# Patient Record
Sex: MALE | Race: BLACK OR AFRICAN AMERICAN | NOT HISPANIC OR LATINO | Employment: OTHER | URBAN - METROPOLITAN AREA
[De-identification: names, ages, dates, MRNs, and addresses within clinical notes are randomized per-mention and may not be internally consistent; named-entity substitution may affect disease eponyms.]

---

## 2024-07-02 ENCOUNTER — TELEPHONE (OUTPATIENT)
Dept: INTERNAL MEDICINE | Facility: CLINIC | Age: 69
End: 2024-07-02
Payer: MEDICARE

## 2024-07-02 NOTE — TELEPHONE ENCOUNTER
----- Message from Alma Reese sent at 7/1/2024  5:32 PM CDT -----  Type:  Sooner Apoointment Request    Caller is requesting a sooner appointment.  Caller declined first available appointment listed below.  Caller will not accept being placed on the waitlist and is requesting a message be sent to doctor.  Name of Caller: Pt  When is the first available appointment?  Symptoms: Annual  Would the patient rather a call back or a response via [facility]? Call  Best Call Back Number:  [phone]  Additional Information:

## 2024-07-10 ENCOUNTER — LAB VISIT (OUTPATIENT)
Dept: LAB | Facility: HOSPITAL | Age: 69
End: 2024-07-10
Attending: INTERNAL MEDICINE
Payer: MEDICARE

## 2024-07-10 ENCOUNTER — OFFICE VISIT (OUTPATIENT)
Dept: INTERNAL MEDICINE | Facility: CLINIC | Age: 69
End: 2024-07-10
Payer: MEDICARE

## 2024-07-10 VITALS
HEIGHT: 71 IN | DIASTOLIC BLOOD PRESSURE: 84 MMHG | HEART RATE: 56 BPM | SYSTOLIC BLOOD PRESSURE: 126 MMHG | BODY MASS INDEX: 21.05 KG/M2 | WEIGHT: 150.38 LBS | OXYGEN SATURATION: 98 %

## 2024-07-10 DIAGNOSIS — R35.89 POLYURIA: ICD-10-CM

## 2024-07-10 DIAGNOSIS — N40.0 BENIGN PROSTATIC HYPERPLASIA, UNSPECIFIED WHETHER LOWER URINARY TRACT SYMPTOMS PRESENT: ICD-10-CM

## 2024-07-10 DIAGNOSIS — Z12.11 COLON CANCER SCREENING: ICD-10-CM

## 2024-07-10 DIAGNOSIS — R97.20 ELEVATED PSA: ICD-10-CM

## 2024-07-10 DIAGNOSIS — E03.9 HYPOTHYROIDISM, UNSPECIFIED TYPE: Primary | ICD-10-CM

## 2024-07-10 DIAGNOSIS — Z11.59 ENCOUNTER FOR HEPATITIS C SCREENING TEST FOR LOW RISK PATIENT: ICD-10-CM

## 2024-07-10 DIAGNOSIS — R79.9 ABNORMAL FINDING OF BLOOD CHEMISTRY, UNSPECIFIED: ICD-10-CM

## 2024-07-10 DIAGNOSIS — Z72.0 TOBACCO ABUSE: ICD-10-CM

## 2024-07-10 DIAGNOSIS — R00.1 BRADYCARDIA: ICD-10-CM

## 2024-07-10 DIAGNOSIS — E03.9 HYPOTHYROIDISM, UNSPECIFIED TYPE: ICD-10-CM

## 2024-07-10 DIAGNOSIS — Z87.891 PERSONAL HISTORY OF NICOTINE DEPENDENCE: ICD-10-CM

## 2024-07-10 LAB
ALBUMIN SERPL BCP-MCNC: 3.9 G/DL (ref 3.5–5.2)
ALP SERPL-CCNC: 76 U/L (ref 55–135)
ALT SERPL W/O P-5'-P-CCNC: 13 U/L (ref 10–44)
ANION GAP SERPL CALC-SCNC: 6 MMOL/L (ref 8–16)
AST SERPL-CCNC: 19 U/L (ref 10–40)
BASOPHILS # BLD AUTO: 0.03 K/UL (ref 0–0.2)
BASOPHILS NFR BLD: 1.2 % (ref 0–1.9)
BILIRUB SERPL-MCNC: 0.4 MG/DL (ref 0.1–1)
BILIRUB UR QL STRIP: NEGATIVE
BUN SERPL-MCNC: 17 MG/DL (ref 8–23)
CALCIUM SERPL-MCNC: 9.7 MG/DL (ref 8.7–10.5)
CHLORIDE SERPL-SCNC: 102 MMOL/L (ref 95–110)
CHOLEST SERPL-MCNC: 152 MG/DL (ref 120–199)
CHOLEST/HDLC SERPL: 2.4 {RATIO} (ref 2–5)
CLARITY UR REFRACT.AUTO: CLEAR
CO2 SERPL-SCNC: 30 MMOL/L (ref 23–29)
COLOR UR AUTO: YELLOW
COMPLEXED PSA SERPL-MCNC: 17.6 NG/ML (ref 0–4)
CREAT SERPL-MCNC: 1.2 MG/DL (ref 0.5–1.4)
DIFFERENTIAL METHOD BLD: ABNORMAL
EOSINOPHIL # BLD AUTO: 0.1 K/UL (ref 0–0.5)
EOSINOPHIL NFR BLD: 3.7 % (ref 0–8)
ERYTHROCYTE [DISTWIDTH] IN BLOOD BY AUTOMATED COUNT: 13.9 % (ref 11.5–14.5)
EST. GFR  (NO RACE VARIABLE): >60 ML/MIN/1.73 M^2
ESTIMATED AVG GLUCOSE: 94 MG/DL (ref 68–131)
GLUCOSE SERPL-MCNC: 71 MG/DL (ref 70–110)
GLUCOSE UR QL STRIP: NEGATIVE
HBA1C MFR BLD: 4.9 % (ref 4–5.6)
HCT VFR BLD AUTO: 45.6 % (ref 40–54)
HCV AB SERPL QL IA: NORMAL
HDLC SERPL-MCNC: 63 MG/DL (ref 40–75)
HDLC SERPL: 41.4 % (ref 20–50)
HGB BLD-MCNC: 14.7 G/DL (ref 14–18)
HGB UR QL STRIP: NEGATIVE
IMM GRANULOCYTES # BLD AUTO: 0.01 K/UL (ref 0–0.04)
IMM GRANULOCYTES NFR BLD AUTO: 0.4 % (ref 0–0.5)
KETONES UR QL STRIP: NEGATIVE
LDLC SERPL CALC-MCNC: 80.2 MG/DL (ref 63–159)
LEUKOCYTE ESTERASE UR QL STRIP: NEGATIVE
LYMPHOCYTES # BLD AUTO: 1.2 K/UL (ref 1–4.8)
LYMPHOCYTES NFR BLD: 48 % (ref 18–48)
MCH RBC QN AUTO: 35.2 PG (ref 27–31)
MCHC RBC AUTO-ENTMCNC: 32.2 G/DL (ref 32–36)
MCV RBC AUTO: 109 FL (ref 82–98)
MONOCYTES # BLD AUTO: 0.4 K/UL (ref 0.3–1)
MONOCYTES NFR BLD: 15.2 % (ref 4–15)
NEUTROPHILS # BLD AUTO: 0.8 K/UL (ref 1.8–7.7)
NEUTROPHILS NFR BLD: 31.5 % (ref 38–73)
NITRITE UR QL STRIP: NEGATIVE
NONHDLC SERPL-MCNC: 89 MG/DL
NRBC BLD-RTO: 0 /100 WBC
PH UR STRIP: 6 [PH] (ref 5–8)
PLATELET # BLD AUTO: 324 K/UL (ref 150–450)
PMV BLD AUTO: 10.6 FL (ref 9.2–12.9)
POTASSIUM SERPL-SCNC: 4.4 MMOL/L (ref 3.5–5.1)
PROT SERPL-MCNC: 7.3 G/DL (ref 6–8.4)
PROT UR QL STRIP: NEGATIVE
RBC # BLD AUTO: 4.18 M/UL (ref 4.6–6.2)
SODIUM SERPL-SCNC: 138 MMOL/L (ref 136–145)
SP GR UR STRIP: 1.02 (ref 1–1.03)
TRIGL SERPL-MCNC: 44 MG/DL (ref 30–150)
TSH SERPL DL<=0.005 MIU/L-ACNC: 0.83 UIU/ML (ref 0.4–4)
URN SPEC COLLECT METH UR: NORMAL
WBC # BLD AUTO: 2.44 K/UL (ref 3.9–12.7)

## 2024-07-10 PROCEDURE — 99204 OFFICE O/P NEW MOD 45 MIN: CPT | Mod: 25,S$GLB,, | Performed by: INTERNAL MEDICINE

## 2024-07-10 PROCEDURE — 36415 COLL VENOUS BLD VENIPUNCTURE: CPT | Performed by: INTERNAL MEDICINE

## 2024-07-10 PROCEDURE — 84443 ASSAY THYROID STIM HORMONE: CPT | Performed by: INTERNAL MEDICINE

## 2024-07-10 PROCEDURE — 90715 TDAP VACCINE 7 YRS/> IM: CPT | Mod: S$GLB,,, | Performed by: INTERNAL MEDICINE

## 2024-07-10 PROCEDURE — 1101F PT FALLS ASSESS-DOCD LE1/YR: CPT | Mod: CPTII,S$GLB,, | Performed by: INTERNAL MEDICINE

## 2024-07-10 PROCEDURE — 90472 IMMUNIZATION ADMIN EACH ADD: CPT | Mod: S$GLB,,, | Performed by: INTERNAL MEDICINE

## 2024-07-10 PROCEDURE — 3288F FALL RISK ASSESSMENT DOCD: CPT | Mod: CPTII,S$GLB,, | Performed by: INTERNAL MEDICINE

## 2024-07-10 PROCEDURE — 1160F RVW MEDS BY RX/DR IN RCRD: CPT | Mod: CPTII,S$GLB,, | Performed by: INTERNAL MEDICINE

## 2024-07-10 PROCEDURE — 3008F BODY MASS INDEX DOCD: CPT | Mod: CPTII,S$GLB,, | Performed by: INTERNAL MEDICINE

## 2024-07-10 PROCEDURE — 81003 URINALYSIS AUTO W/O SCOPE: CPT | Performed by: INTERNAL MEDICINE

## 2024-07-10 PROCEDURE — 80061 LIPID PANEL: CPT | Performed by: INTERNAL MEDICINE

## 2024-07-10 PROCEDURE — 99999 PR PBB SHADOW E&M-EST. PATIENT-LVL III: CPT | Mod: PBBFAC,,, | Performed by: INTERNAL MEDICINE

## 2024-07-10 PROCEDURE — G0009 ADMIN PNEUMOCOCCAL VACCINE: HCPCS | Mod: S$GLB,,, | Performed by: INTERNAL MEDICINE

## 2024-07-10 PROCEDURE — 90677 PCV20 VACCINE IM: CPT | Mod: S$GLB,,, | Performed by: INTERNAL MEDICINE

## 2024-07-10 PROCEDURE — 86803 HEPATITIS C AB TEST: CPT | Performed by: INTERNAL MEDICINE

## 2024-07-10 PROCEDURE — 85025 COMPLETE CBC W/AUTO DIFF WBC: CPT | Performed by: INTERNAL MEDICINE

## 2024-07-10 PROCEDURE — 1159F MED LIST DOCD IN RCRD: CPT | Mod: CPTII,S$GLB,, | Performed by: INTERNAL MEDICINE

## 2024-07-10 PROCEDURE — 80053 COMPREHEN METABOLIC PANEL: CPT | Performed by: INTERNAL MEDICINE

## 2024-07-10 PROCEDURE — 3079F DIAST BP 80-89 MM HG: CPT | Mod: CPTII,S$GLB,, | Performed by: INTERNAL MEDICINE

## 2024-07-10 PROCEDURE — 1126F AMNT PAIN NOTED NONE PRSNT: CPT | Mod: CPTII,S$GLB,, | Performed by: INTERNAL MEDICINE

## 2024-07-10 PROCEDURE — 3044F HG A1C LEVEL LT 7.0%: CPT | Mod: CPTII,S$GLB,, | Performed by: INTERNAL MEDICINE

## 2024-07-10 PROCEDURE — 83036 HEMOGLOBIN GLYCOSYLATED A1C: CPT | Performed by: INTERNAL MEDICINE

## 2024-07-10 PROCEDURE — 3074F SYST BP LT 130 MM HG: CPT | Mod: CPTII,S$GLB,, | Performed by: INTERNAL MEDICINE

## 2024-07-10 PROCEDURE — 84153 ASSAY OF PSA TOTAL: CPT | Performed by: INTERNAL MEDICINE

## 2024-07-10 RX ORDER — TAMSULOSIN HYDROCHLORIDE 0.4 MG/1
0.8 CAPSULE ORAL DAILY
Qty: 180 CAPSULE | Refills: 3 | Status: SHIPPED | OUTPATIENT
Start: 2024-07-10

## 2024-07-10 RX ORDER — TAMSULOSIN HYDROCHLORIDE 0.4 MG/1
1 CAPSULE ORAL
COMMUNITY
Start: 2024-03-16 | End: 2024-07-10 | Stop reason: SDUPTHER

## 2024-07-10 RX ORDER — LEVOTHYROXINE SODIUM 175 UG/1
175 TABLET ORAL EVERY MORNING
COMMUNITY
End: 2024-07-10 | Stop reason: SDUPTHER

## 2024-07-10 RX ORDER — LEVOTHYROXINE SODIUM 175 UG/1
175 TABLET ORAL EVERY MORNING
Qty: 90 TABLET | Refills: 3 | Status: SHIPPED | OUTPATIENT
Start: 2024-07-10 | End: 2024-07-10 | Stop reason: SDUPTHER

## 2024-07-10 RX ORDER — TAMSULOSIN HYDROCHLORIDE 0.4 MG/1
0.8 CAPSULE ORAL DAILY
Qty: 180 CAPSULE | Refills: 3 | Status: SHIPPED | OUTPATIENT
Start: 2024-07-10 | End: 2024-07-10 | Stop reason: SDUPTHER

## 2024-07-10 RX ORDER — LEVOTHYROXINE SODIUM 175 UG/1
175 TABLET ORAL EVERY MORNING
Qty: 90 TABLET | Refills: 3 | Status: SHIPPED | OUTPATIENT
Start: 2024-07-10

## 2024-07-10 NOTE — PROGRESS NOTES
"  This note was generated with whistleBox voice recognition software. I apologize for any possible typographical errors.  MModal seems to have trouble with pronouns so I apologize if I Mis pronoun anyone     New Patient coming in to establish care.  [place] [facility] [place]      BPH-- has had for several months. On tamisulin which helped out a lot first, but he has started having more issues with frequent urination. No incontinence - has to get up 4 times a night .  Out of his tamisulin today for about a months.  I see " elevated PSA" as a diagnosis on his med records- can't see full notes. Note from 3/2024-  Did not see urologist.  I also see a nephrologist referral-- did not see nephrologist.   This was from 7/2022      He has hypothyroidism- he is taking  175 mcg-  he had thyroid ablation- radioactive over 20 years ago.  He has meds-- energy level is doing ok.      Tobacco abuse-- he smokes 0.5-1.0 ppd-  He has smoked 40 years-- Does not want to quit.          Social history:  rare etoh use.  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    Single- living alone unless sister comes ot stay with him.      PSH:  tosillectomy at 4 year old.    Never had colonoscopy-    Father had prostate cancer. And diabetes , htn   Mother became type 2 at 75.      ROS : Gen - no fatigue or significant weight change  Eyes - no eye pain or visual changes-- got eye glasses last year.    ENT - no hoarseness or sore throat  CV - No chest pain or SOB.  NO palpitations.  Pulm - no cough or wheezing  GI - no N/V/D   no dysuria or incontinence  MS - no joint pain or muscle pain  Skin - no rash, or c/o of skin lesions  Neuro - no HA, dizziness--- memory is doing well.   Heme - no abnormal bleeding or bruising  Endo - no polydipsia, or temperature changes  Psych - no anxiety or depression           /84 (BP Location: Left arm, Patient Position: Sitting, BP Method: Medium (Manual))   Pulse (!) 56   Ht 5' 11" (1.803 m)   Wt " 68.2 kg (150 lb 5.7 oz)   SpO2 98%   BMI 20.97 kg/m²     General appearance: alert, appears stated age, and cooperative  Head: Normocephalic, without obvious abnormality, atraumatic  Eyes: conjunctivae/corneas clear. PERRL, EOM's intact. Fundi benign.  Neck: no adenopathy, no carotid bruit, no JVD, supple, symmetrical, trachea midline, and thyroid not enlarged, symmetric, no tenderness/mass/nodules  Back: symmetric, no curvature. ROM normal. No CVA tenderness.  Lungs: clear to auscultation bilaterally  Chest wall: no tenderness  Heart: regular rate and rhythm, S1, S2 normal, no murmur, click, rub or gallop  Abdomen: soft, non-tender; bowel sounds normal; no masses,  no organomegaly  Extremities: extremities normal, atraumatic, no cyanosis or edema  Pulses: 2+ and symmetric     No carotid bruit-  Bald head-- sister is here with him.   .      Hypothyroidism, unspecified type  -     Lipid Panel; Future; Expected date: 07/10/2024  -     TSH; Future; Expected date: 07/10/2024    Elevated PSA  -     Prostate Specific Antigen, Diagnostic; Future; Expected date: 07/10/2024  -     Urinalysis    Benign prostatic hyperplasia, unspecified whether lower urinary tract symptoms present    Tobacco abuse  -     CT Chest Lung Screening Low Dose; Future; Expected date: 07/10/2024    Bradycardia  -     CBC Auto Differential; Future; Expected date: 07/10/2024  -     SCHEDULED EKG 12-LEAD (to Muse); Future    Encounter for hepatitis C screening test for low risk patient  -     HEPATITIS C ANTIBODY; Future; Expected date: 07/10/2024    Polyuria  -     Comprehensive Metabolic Panel; Future; Expected date: 07/10/2024  -     Hemoglobin A1C; Future; Expected date: 07/10/2024    Colon cancer screening  -     Cologuard Screening (Multitarget Stool DNA); Future; Expected date: 07/10/2024    Abnormal finding of blood chemistry, unspecified  -     Hemoglobin A1C; Future; Expected date: 07/10/2024    Personal history of nicotine dependence  -      CT Chest Lung Screening Low Dose; Future; Expected date: 07/10/2024    Other orders  -     levothyroxine (SYNTHROID, LEVOTHROID) 175 MCG tablet; Take 1 tablet (175 mcg total) by mouth every morning.  Dispense: 90 tablet; Refill: 3  -     tamsulosin (FLOMAX) 0.4 mg Cap; Take 2 capsules (0.8 mg total) by mouth once daily.  Dispense: 180 capsule; Refill: 3     Maybe CKD and maybe elevated PSA

## 2024-07-10 NOTE — PROGRESS NOTES
Two pt identifier verified. Pt tolerated well. Advised pt to wait 15 minutes post immunization. Pt verbalized understanding.    Anushka GREER

## 2024-07-11 ENCOUNTER — TELEPHONE (OUTPATIENT)
Dept: INTERNAL MEDICINE | Facility: CLINIC | Age: 69
End: 2024-07-11
Payer: MEDICARE

## 2024-07-11 DIAGNOSIS — R97.20 ELEVATED PSA: Primary | ICD-10-CM

## 2024-07-11 NOTE — TELEPHONE ENCOUNTER
His labs looked better than expected.  Kidney function is fine but his PSA is elevated and he needs to be evaluated by urology for increased risk of prostate cancer- like we discssed at his visit-- I put urology referral in and he needs help getting in to see them please.

## 2024-07-11 NOTE — TELEPHONE ENCOUNTER
Informed pt of lab results, he verbalized understanding. Pt also scheduled for urology.    Anushka GREER

## 2024-07-15 ENCOUNTER — HOSPITAL ENCOUNTER (OUTPATIENT)
Dept: CARDIOLOGY | Facility: CLINIC | Age: 69
Discharge: HOME | End: 2024-07-15
Payer: MEDICARE

## 2024-07-15 ENCOUNTER — HOSPITAL ENCOUNTER (OUTPATIENT)
Dept: RADIOLOGY | Facility: HOSPITAL | Age: 69
Discharge: HOME | End: 2024-07-15
Attending: INTERNAL MEDICINE
Payer: MEDICARE

## 2024-07-15 ENCOUNTER — OFFICE VISIT (OUTPATIENT)
Dept: UROLOGY | Facility: CLINIC | Age: 69
End: 2024-07-15
Payer: MEDICARE

## 2024-07-15 VITALS
HEIGHT: 71 IN | DIASTOLIC BLOOD PRESSURE: 79 MMHG | HEART RATE: 62 BPM | SYSTOLIC BLOOD PRESSURE: 126 MMHG | WEIGHT: 149.94 LBS | BODY MASS INDEX: 20.99 KG/M2

## 2024-07-15 DIAGNOSIS — Z87.891 PERSONAL HISTORY OF NICOTINE DEPENDENCE: ICD-10-CM

## 2024-07-15 DIAGNOSIS — N13.8 BPH WITH URINARY OBSTRUCTION: ICD-10-CM

## 2024-07-15 DIAGNOSIS — R00.1 BRADYCARDIA: ICD-10-CM

## 2024-07-15 DIAGNOSIS — N40.1 BPH WITH URINARY OBSTRUCTION: ICD-10-CM

## 2024-07-15 DIAGNOSIS — Z72.0 TOBACCO ABUSE: ICD-10-CM

## 2024-07-15 DIAGNOSIS — R97.20 ELEVATED PSA: Primary | ICD-10-CM

## 2024-07-15 LAB
OHS QRS DURATION: 72 MS
OHS QTC CALCULATION: 420 MS

## 2024-07-15 PROCEDURE — 1126F AMNT PAIN NOTED NONE PRSNT: CPT | Mod: CPTII,S$GLB,, | Performed by: UROLOGY

## 2024-07-15 PROCEDURE — 1160F RVW MEDS BY RX/DR IN RCRD: CPT | Mod: CPTII,S$GLB,, | Performed by: UROLOGY

## 2024-07-15 PROCEDURE — 99999 PR PBB SHADOW E&M-EST. PATIENT-LVL III: CPT | Mod: PBBFAC,,, | Performed by: UROLOGY

## 2024-07-15 PROCEDURE — 3288F FALL RISK ASSESSMENT DOCD: CPT | Mod: CPTII,S$GLB,, | Performed by: UROLOGY

## 2024-07-15 PROCEDURE — 93010 ELECTROCARDIOGRAM REPORT: CPT | Mod: S$GLB,,, | Performed by: INTERNAL MEDICINE

## 2024-07-15 PROCEDURE — 3008F BODY MASS INDEX DOCD: CPT | Mod: CPTII,S$GLB,, | Performed by: UROLOGY

## 2024-07-15 PROCEDURE — 71271 CT THORAX LUNG CANCER SCR C-: CPT | Mod: 26,,, | Performed by: STUDENT IN AN ORGANIZED HEALTH CARE EDUCATION/TRAINING PROGRAM

## 2024-07-15 PROCEDURE — 99204 OFFICE O/P NEW MOD 45 MIN: CPT | Mod: S$GLB,,, | Performed by: UROLOGY

## 2024-07-15 PROCEDURE — 93005 ELECTROCARDIOGRAM TRACING: CPT | Mod: S$GLB,,, | Performed by: INTERNAL MEDICINE

## 2024-07-15 PROCEDURE — 1101F PT FALLS ASSESS-DOCD LE1/YR: CPT | Mod: CPTII,S$GLB,, | Performed by: UROLOGY

## 2024-07-15 PROCEDURE — 3074F SYST BP LT 130 MM HG: CPT | Mod: CPTII,S$GLB,, | Performed by: UROLOGY

## 2024-07-15 PROCEDURE — 71271 CT THORAX LUNG CANCER SCR C-: CPT | Mod: TC

## 2024-07-15 PROCEDURE — 1159F MED LIST DOCD IN RCRD: CPT | Mod: CPTII,S$GLB,, | Performed by: UROLOGY

## 2024-07-15 PROCEDURE — 3044F HG A1C LEVEL LT 7.0%: CPT | Mod: CPTII,S$GLB,, | Performed by: UROLOGY

## 2024-07-15 PROCEDURE — 3078F DIAST BP <80 MM HG: CPT | Mod: CPTII,S$GLB,, | Performed by: UROLOGY

## 2024-07-15 RX ORDER — LIDOCAINE HYDROCHLORIDE 20 MG/ML
JELLY TOPICAL
Status: SHIPPED | OUTPATIENT
Start: 2024-07-22

## 2024-07-15 RX ORDER — CEFTRIAXONE 1 G/1
1 INJECTION, POWDER, FOR SOLUTION INTRAMUSCULAR; INTRAVENOUS
Status: SHIPPED | OUTPATIENT
Start: 2024-07-15 | End: 2024-07-16

## 2024-07-15 RX ORDER — LIDOCAINE HYDROCHLORIDE 10 MG/ML
10 INJECTION INFILTRATION; PERINEURAL
Status: SHIPPED | OUTPATIENT
Start: 2024-07-22

## 2024-07-15 RX ORDER — CIPROFLOXACIN 500 MG/1
TABLET ORAL
Qty: 1 TABLET | Refills: 0 | Status: SHIPPED | OUTPATIENT
Start: 2024-07-15

## 2024-07-15 NOTE — LETTER
July 15, 2024        Stephan Hui Jr., MD  [address]  [place] [address]             [facility] - Urology Atrium 4th Fl  [address]  [facility] [address]  Phone: [phone]   Patient: Gavin Balderrama   MR Number: [id number]   YOB: 1955   Date of Visit: 7/15/2024       Dear Dr. Hui:    Thank you for referring Gavin Balderrama to me for evaluation. Attached you will find relevant portions of my assessment and plan of care.    If you have questions, please do not hesitate to call me. I look forward to following Gavin Balderrama along with you.    Sincerely,      Everette Solorio MD            CC  No Recipients    Enclosure

## 2024-07-15 NOTE — PROGRESS NOTES
CHIEF COMPLAINT:    Mr. Balderrama is a 68 y.o. male presenting for a consultation at the request of Dr. Hui. Patient presents with an elevated PSA.    PRESENTING ILLNESS:    Gavin Balderrama is a 68 y.o. male who was found to have an elevated PSA by his PCP. + family history of prostate cancer.    He has LUTS.  + incomplete empyting, + intermittency, +decreased FOS, nocturia x 5-6, + frequency, + urgency.  He's on flomax BID.    REVIEW OF SYSTEMS:    Gavin Balderrama denies headache, blurred vision, fever, nausea, vomiting, chills, abdominal pain, chest pain, sore throat, bleeding per rectum, cough, SOB, recent loss of consciousness, recent mental status changes, seizures, dizziness, or upper or lower extremity weakness.    KENNETH  1. 1  2. 3  3. 2  4. 3  5. 3      PATIENT HISTORY:    Past Medical History:   Diagnosis Date    BPH (benign prostatic hyperplasia)     Disorder of thyroid, unspecified        History reviewed. No pertinent surgical history.    No family history on file.    Social History     Socioeconomic History    Marital status: Single       Allergies:  Patient has no known allergies.    Medications:    Current Outpatient Medications:     levothyroxine (SYNTHROID, LEVOTHROID) 175 MCG tablet, Take 1 tablet (175 mcg total) by mouth every morning., Disp: 90 tablet, Rfl: 3    tamsulosin (FLOMAX) 0.4 mg Cap, Take 2 capsules (0.8 mg total) by mouth once daily., Disp: 180 capsule, Rfl: 3    PHYSICAL EXAMINATION:    The patient generally appears in good health, is appropriately interactive, and is in no apparent distress.     Eyes: anicteric sclerae, moist conjunctivae; no lid-lag; PERRLA     HENT: Atraumatic; oropharynx clear with moist mucous membranes and no mucosal ulcerations;normal hard and soft palate.  No evidence of lymphadenopathy.    Neck: Trachea midline.  No thyromegaly.    Skin: No lesions.    Mental: Cooperative with normal affect.  Is oriented to time, place, and person.    Neuro: Grossly  intact.    Chest: Normal inspiratory effort.   No accessory muscles.  No audible wheezes.  Respirations symmetric on inspiration and expiration.    Heart: Regular rhythm.      Abdomen:  Soft, non-tender. No masses or organomegaly. Bladder is not palpable. No evidence of flank discomfort. No evidence of inguinal hernia.    Genitourinary: The penis is not circumcised with no evidence of plaques or induration. The urethral meatus is normal. The testes, epididymides, and cord structures are normal in size and contour bilaterally. The scrotum is normal in size and contour.    Normal anal sphincter tone. No rectal mass.    The prostate is 50 g. Normal landmarks. Lateral sulci. Median furrow intact.  No nodularity or induration. Seminal vesicles are normal.    Extremities: No clubbing, cyanosis, or edema      LABS:    UA dipped negative today  Lab Results   Component Value Date    PSADIAG 17.6 (H) 07/10/2024       IMPRESSION:    Encounter Diagnoses   Name Primary?    Elevated PSA Yes    BPH with urinary obstruction          PLAN:    1. Continue flomax BID for his LUTS.  2. Will recheck a PSA in 6 weeks.  If it remains elevated, recommend MRI and TRUS bx. Risks and benefits of TRUS bx discussed in detail today.  We discussed bleeding, pain, and infection.  Alternatives discussed.  He was given the chance to ask quesitons.  A new Rx for Cipro was given for prophylaxis.  Rocephin also ordered.   3. Will base his follow up off his PSA.    Copy to: Korina

## 2024-08-26 ENCOUNTER — LAB VISIT (OUTPATIENT)
Dept: LAB | Facility: HOSPITAL | Age: 69
End: 2024-08-26
Attending: UROLOGY
Payer: MEDICARE

## 2024-08-26 ENCOUNTER — TELEPHONE (OUTPATIENT)
Dept: UROLOGY | Facility: CLINIC | Age: 69
End: 2024-08-26
Payer: MEDICARE

## 2024-08-26 DIAGNOSIS — R97.20 ELEVATED PSA: ICD-10-CM

## 2024-08-26 DIAGNOSIS — R97.20 ELEVATED PSA: Primary | ICD-10-CM

## 2024-08-26 LAB — COMPLEXED PSA SERPL-MCNC: 24.4 NG/ML (ref 0–4)

## 2024-08-26 PROCEDURE — 36415 COLL VENOUS BLD VENIPUNCTURE: CPT | Performed by: UROLOGY

## 2024-08-26 PROCEDURE — 84153 ASSAY OF PSA TOTAL: CPT | Performed by: UROLOGY

## 2024-08-26 NOTE — TELEPHONE ENCOUNTER
Per MD: Please notify that his PSA has remained elevated.  Recommend prostate MRI.  Will schedule for TRUS bx after the MRI is reviewed.  Pt verbalized understanding and agreed. MRI scheduled and added to wait list for earlier appt.

## 2024-08-26 NOTE — TELEPHONE ENCOUNTER
Please notify that his PSA has remained elevated.  Recommend prostate MRI.  Will schedule for TRUS bx after the MRI is reviewed.

## 2024-08-27 DIAGNOSIS — Z00.00 ENCOUNTER FOR MEDICARE ANNUAL WELLNESS EXAM: ICD-10-CM

## 2024-09-30 ENCOUNTER — HOSPITAL ENCOUNTER (OUTPATIENT)
Dept: RADIOLOGY | Facility: HOSPITAL | Age: 69
Discharge: HOME | End: 2024-09-30
Attending: UROLOGY
Payer: MEDICARE

## 2024-09-30 DIAGNOSIS — R97.20 ELEVATED PSA: ICD-10-CM

## 2024-09-30 PROCEDURE — 25500020 PHARM REV CODE 255: Performed by: UROLOGY

## 2024-09-30 PROCEDURE — 72197 MRI PELVIS W/O & W/DYE: CPT | Mod: TC

## 2024-09-30 PROCEDURE — A9585 GADOBUTROL INJECTION: HCPCS | Performed by: UROLOGY

## 2024-09-30 PROCEDURE — 72197 MRI PELVIS W/O & W/DYE: CPT | Mod: 26,,, | Performed by: RADIOLOGY

## 2024-09-30 RX ORDER — GADOBUTROL 604.72 MG/ML
10 INJECTION INTRAVENOUS
Status: COMPLETED | OUTPATIENT
Start: 2024-09-30 | End: 2024-09-30

## 2024-09-30 RX ADMIN — GADOBUTROL 10 ML: 604.72 INJECTION INTRAVENOUS at 06:09

## 2024-10-02 ENCOUNTER — TELEPHONE (OUTPATIENT)
Dept: UROLOGY | Facility: CLINIC | Age: 69
End: 2024-10-02
Payer: MEDICARE

## 2024-10-02 ENCOUNTER — PATIENT MESSAGE (OUTPATIENT)
Dept: UROLOGY | Facility: CLINIC | Age: 69
End: 2024-10-02
Payer: MEDICARE

## 2024-10-02 NOTE — TELEPHONE ENCOUNTER
Call was placed to patient no answer left VM.Will reach out through [employer].     Please notify his MRI was reviewed.  Please set up for TRUS bx.

## 2024-10-10 ENCOUNTER — PATIENT MESSAGE (OUTPATIENT)
Dept: UROLOGY | Facility: CLINIC | Age: 69
End: 2024-10-10
Payer: MEDICARE

## 2024-10-10 ENCOUNTER — TELEPHONE (OUTPATIENT)
Dept: UROLOGY | Facility: CLINIC | Age: 69
End: 2024-10-10
Payer: MEDICARE

## 2024-10-11 ENCOUNTER — TELEPHONE (OUTPATIENT)
Dept: UROLOGY | Facility: CLINIC | Age: 69
End: 2024-10-11
Payer: MEDICARE

## 2024-10-11 NOTE — TELEPHONE ENCOUNTER
----- Message from Abhi sent at 10/11/2024  9:25 AM CDT -----  Regarding: Appt  Contact: [phone]  Patient is calling to reschedule appt. Please contact pt

## 2024-10-11 NOTE — TELEPHONE ENCOUNTER
Call was placed back to patient he stated he couldn't come in today had prior engagement appointment was successfully rescheduled.

## 2024-10-17 ENCOUNTER — TELEPHONE (OUTPATIENT)
Dept: UROLOGY | Facility: CLINIC | Age: 69
End: 2024-10-17
Payer: MEDICARE

## 2024-10-17 NOTE — TELEPHONE ENCOUNTER
Call placed to patient to confirm appt. Patient stated he was not aware of any directions for his procedure (fleets enema, abx). Antibiotics were previously sent over to [facility] pharmacy. Informed patient that he will need to purchase a fleets enema and administer that 1.5-2 hours before coming in. Patient was rescheduled from last week for the same thing. Patient asked to be rescheduled to next Friday so that he could get the enema and abx beforehand. Patient rescheduled. Procedure staff will follow up with patient on Monday.

## 2024-10-25 RX ORDER — CIPROFLOXACIN 500 MG/1
TABLET ORAL
Qty: 1 TABLET | Refills: 0 | Status: SHIPPED | OUTPATIENT
Start: 2024-10-25

## 2024-10-28 ENCOUNTER — TELEPHONE (OUTPATIENT)
Dept: UROLOGY | Facility: CLINIC | Age: 69
End: 2024-10-28
Payer: MEDICARE

## 2024-11-14 ENCOUNTER — TELEPHONE (OUTPATIENT)
Dept: UROLOGY | Facility: CLINIC | Age: 69
End: 2024-11-14
Payer: MEDICARE

## 2024-11-14 NOTE — TELEPHONE ENCOUNTER
Call placed to pt regarding canceled TRUS with biopsy. Pt informed that Dr. Solorio states that by canceling 4 times he is putting himself at risk for death from prostate cancer, recommends he reschedules with another provider. Pt verbalized understanding, states he will reschedule with another provider and cannot make tomorrow, so he canceled to avoid no showing appointment.

## 2024-11-25 ENCOUNTER — OFFICE VISIT (OUTPATIENT)
Dept: INTERNAL MEDICINE | Facility: CLINIC | Age: 69
End: 2024-11-25
Payer: MEDICARE

## 2024-11-25 ENCOUNTER — HOSPITAL ENCOUNTER (OUTPATIENT)
Dept: RADIOLOGY | Facility: HOSPITAL | Age: 69
Discharge: HOME | End: 2024-11-25
Attending: INTERNAL MEDICINE
Payer: MEDICARE

## 2024-11-25 VITALS
HEART RATE: 80 BPM | WEIGHT: 167.31 LBS | BODY MASS INDEX: 23.42 KG/M2 | DIASTOLIC BLOOD PRESSURE: 78 MMHG | HEIGHT: 71 IN | SYSTOLIC BLOOD PRESSURE: 116 MMHG | OXYGEN SATURATION: 97 %

## 2024-11-25 DIAGNOSIS — E03.9 HYPOTHYROIDISM, UNSPECIFIED TYPE: ICD-10-CM

## 2024-11-25 DIAGNOSIS — Z72.0 TOBACCO ABUSE: ICD-10-CM

## 2024-11-25 DIAGNOSIS — F14.10 COCAINE ABUSE: ICD-10-CM

## 2024-11-25 DIAGNOSIS — M17.12 PRIMARY LOCALIZED OSTEOARTHROSIS OF LEFT LOWER LEG: ICD-10-CM

## 2024-11-25 DIAGNOSIS — R97.20 ELEVATED PSA: Primary | ICD-10-CM

## 2024-11-25 PROCEDURE — 99215 OFFICE O/P EST HI 40 MIN: CPT | Mod: S$GLB,,, | Performed by: INTERNAL MEDICINE

## 2024-11-25 PROCEDURE — 1101F PT FALLS ASSESS-DOCD LE1/YR: CPT | Mod: CPTII,S$GLB,, | Performed by: INTERNAL MEDICINE

## 2024-11-25 PROCEDURE — 1159F MED LIST DOCD IN RCRD: CPT | Mod: CPTII,S$GLB,, | Performed by: INTERNAL MEDICINE

## 2024-11-25 PROCEDURE — 3008F BODY MASS INDEX DOCD: CPT | Mod: CPTII,S$GLB,, | Performed by: INTERNAL MEDICINE

## 2024-11-25 PROCEDURE — 3078F DIAST BP <80 MM HG: CPT | Mod: CPTII,S$GLB,, | Performed by: INTERNAL MEDICINE

## 2024-11-25 PROCEDURE — 1125F AMNT PAIN NOTED PAIN PRSNT: CPT | Mod: CPTII,S$GLB,, | Performed by: INTERNAL MEDICINE

## 2024-11-25 PROCEDURE — 3074F SYST BP LT 130 MM HG: CPT | Mod: CPTII,S$GLB,, | Performed by: INTERNAL MEDICINE

## 2024-11-25 PROCEDURE — 73562 X-RAY EXAM OF KNEE 3: CPT | Mod: TC,LT

## 2024-11-25 PROCEDURE — 99999 PR PBB SHADOW E&M-EST. PATIENT-LVL IV: CPT | Mod: PBBFAC,,, | Performed by: INTERNAL MEDICINE

## 2024-11-25 PROCEDURE — 3044F HG A1C LEVEL LT 7.0%: CPT | Mod: CPTII,S$GLB,, | Performed by: INTERNAL MEDICINE

## 2024-11-25 PROCEDURE — 3288F FALL RISK ASSESSMENT DOCD: CPT | Mod: CPTII,S$GLB,, | Performed by: INTERNAL MEDICINE

## 2024-11-25 PROCEDURE — 73562 X-RAY EXAM OF KNEE 3: CPT | Mod: 26,LT,, | Performed by: RADIOLOGY

## 2024-11-25 RX ORDER — DICLOFENAC SODIUM 50 MG/1
50 TABLET, DELAYED RELEASE ORAL 2 TIMES DAILY
Qty: 60 TABLET | Refills: 2 | Status: SHIPPED | OUTPATIENT
Start: 2024-11-25

## 2024-11-25 NOTE — PROGRESS NOTES
"  This note was generated with Verosee voice recognition software. I apologize for any possible typographical errors.  MMkale seems to have trouble with pronouns so I apologize if I Mis pronoun anyone     He is a 67 yo gentleman coming in for leg pain for the last 12 months.   Pain in the knee of the left leg.  It hurts when he gets up on it.  He had hit it over 10 years ago.  The knee swelled up and he had to have it drained then at [facility].  He has not taken anything for it.   Nothing makes it better or worse.      His PSA is 24.4-- saw urology but canceled urology biopsy 4 times-- says he needs to find a new Urologist.  MRI of prostate 9/30/2024 showed: Findings suggestive of prostatitis. Benign prostatic hyperplasia. No focal lesion suspicious for prostate malignancy.  Sister believes it is because he has to do enema before the biopsy-- he express some worried about this.      Sister is here with him-- She mention he is doing with crack cocaine.  He is interested in doing something- plans to get into some program in the new year.  We have long discussion about getting health.  Discussed support- sister ( lives out of town, two ex-wives support him. )  He has been in programs for years.  Smoking only - nothing in veins.         ROS : Gen - no fatigue or significant weight change  Eyes - no eye pain or visual changes  ENT - no hoarseness or sore throat-- + nasal congestion   CV - No chest pain or SOB.  NO palpitations.  Pulm - no cough or wheezing  GI - no N/V/D   no dysuria or incontinence  MS -as above   Skin - no rash, or c/o of skin lesions  Neuro - no HA, dizziness--- memory is doing well.   Heme - no abnormal bleeding or bruising  Endo - no polydipsia, or temperature changes  Psych - no anxiety or depression       /78 (BP Location: Right arm, Patient Position: Sitting)   Pulse 80   Ht 5' 10.5" (1.791 m)   Wt 75.9 kg (167 lb 5.3 oz)   SpO2 97%   BMI 23.67 kg/m²   Elderly AA here today " with his sister.  She gives some of his history.   Nasal congestion.  Alert- oriented.   Chest: cta bilaterally   CV: rrr without murmur.   Abdomen- Soft- NT-  LE: has crepitance of both knees- left worse than right.    Xrays reports from 2015 reviewed-       Assessment and plan:    Elevated PSA  -     Ambulatory referral/consult to Urology; Future; Expected date: 12/02/2024  -     PROSTATE SPECIFIC ANTIGEN, DIAGNOSTIC; Future; Expected date: 11/25/2024    Hypothyroidism, unspecified type    Tobacco abuse    Cocaine abuse  -     Ambulatory referral/consult to Addiction Psychiatry; Future; Expected date: 12/02/2024    Primary localized osteoarthrosis of left lower leg  -     X-Ray Knee 3 View Left; Future; Expected date: 11/25/2024  -     Ambulatory referral/consult to Orthopedics; Future; Expected date: 12/02/2024  -     diclofenac (VOLTAREN) 50 MG EC tablet; Take 1 tablet (50 mg total) by mouth 2 (two) times daily.  Dispense: 60 tablet; Refill: 2              Answers submitted by the patient for this visit:  Review of Systems Questionnaire (Submitted on 11/22/2024)  activity change: No  unexpected weight change: No  neck pain: No  hearing loss: Yes  rhinorrhea: Yes  trouble swallowing: No  eye discharge: No  visual disturbance: No  chest tightness: No  wheezing: No  chest pain: No  palpitations: No  blood in stool: No  constipation: No  vomiting: No  diarrhea: No  polydipsia: No  polyuria: Yes  difficulty urinating: No  urgency: Yes  hematuria: No  joint swelling: No  arthralgias: Yes  headaches: No  weakness: No  confusion: No  dysphoric mood: No

## 2024-12-02 ENCOUNTER — OFFICE VISIT (OUTPATIENT)
Dept: ORTHOPEDICS | Facility: CLINIC | Age: 69
End: 2024-12-02
Payer: MEDICARE

## 2024-12-02 ENCOUNTER — OFFICE VISIT (OUTPATIENT)
Dept: UROLOGY | Facility: CLINIC | Age: 69
End: 2024-12-02
Payer: MEDICARE

## 2024-12-02 VITALS
DIASTOLIC BLOOD PRESSURE: 76 MMHG | HEART RATE: 78 BPM | BODY MASS INDEX: 23.15 KG/M2 | SYSTOLIC BLOOD PRESSURE: 127 MMHG | WEIGHT: 165.38 LBS | HEIGHT: 71 IN

## 2024-12-02 DIAGNOSIS — R97.20 ELEVATED PSA: ICD-10-CM

## 2024-12-02 DIAGNOSIS — N13.8 BPH WITH URINARY OBSTRUCTION: Primary | ICD-10-CM

## 2024-12-02 DIAGNOSIS — N40.1 BPH WITH URINARY OBSTRUCTION: Primary | ICD-10-CM

## 2024-12-02 DIAGNOSIS — Z72.0 TOBACCO ABUSE: ICD-10-CM

## 2024-12-02 DIAGNOSIS — M17.12 PRIMARY OSTEOARTHRITIS OF LEFT KNEE: ICD-10-CM

## 2024-12-02 PROCEDURE — 99204 OFFICE O/P NEW MOD 45 MIN: CPT | Mod: S$GLB,,, | Performed by: NURSE PRACTITIONER

## 2024-12-02 PROCEDURE — 3288F FALL RISK ASSESSMENT DOCD: CPT | Mod: CPTII,S$GLB,, | Performed by: STUDENT IN AN ORGANIZED HEALTH CARE EDUCATION/TRAINING PROGRAM

## 2024-12-02 PROCEDURE — 1159F MED LIST DOCD IN RCRD: CPT | Mod: CPTII,S$GLB,, | Performed by: STUDENT IN AN ORGANIZED HEALTH CARE EDUCATION/TRAINING PROGRAM

## 2024-12-02 PROCEDURE — 3044F HG A1C LEVEL LT 7.0%: CPT | Mod: CPTII,S$GLB,, | Performed by: STUDENT IN AN ORGANIZED HEALTH CARE EDUCATION/TRAINING PROGRAM

## 2024-12-02 PROCEDURE — 3074F SYST BP LT 130 MM HG: CPT | Mod: CPTII,S$GLB,, | Performed by: STUDENT IN AN ORGANIZED HEALTH CARE EDUCATION/TRAINING PROGRAM

## 2024-12-02 PROCEDURE — 99999 PR PBB SHADOW E&M-EST. PATIENT-LVL II: CPT | Mod: PBBFAC,,, | Performed by: NURSE PRACTITIONER

## 2024-12-02 PROCEDURE — 99213 OFFICE O/P EST LOW 20 MIN: CPT | Mod: S$GLB,,, | Performed by: STUDENT IN AN ORGANIZED HEALTH CARE EDUCATION/TRAINING PROGRAM

## 2024-12-02 PROCEDURE — 99999 PR PBB SHADOW E&M-EST. PATIENT-LVL III: CPT | Mod: PBBFAC,,, | Performed by: STUDENT IN AN ORGANIZED HEALTH CARE EDUCATION/TRAINING PROGRAM

## 2024-12-02 PROCEDURE — 3078F DIAST BP <80 MM HG: CPT | Mod: CPTII,S$GLB,, | Performed by: STUDENT IN AN ORGANIZED HEALTH CARE EDUCATION/TRAINING PROGRAM

## 2024-12-02 PROCEDURE — 3008F BODY MASS INDEX DOCD: CPT | Mod: CPTII,S$GLB,, | Performed by: STUDENT IN AN ORGANIZED HEALTH CARE EDUCATION/TRAINING PROGRAM

## 2024-12-02 PROCEDURE — 1160F RVW MEDS BY RX/DR IN RCRD: CPT | Mod: CPTII,S$GLB,, | Performed by: NURSE PRACTITIONER

## 2024-12-02 PROCEDURE — 1126F AMNT PAIN NOTED NONE PRSNT: CPT | Mod: CPTII,S$GLB,, | Performed by: STUDENT IN AN ORGANIZED HEALTH CARE EDUCATION/TRAINING PROGRAM

## 2024-12-02 PROCEDURE — 3044F HG A1C LEVEL LT 7.0%: CPT | Mod: CPTII,S$GLB,, | Performed by: NURSE PRACTITIONER

## 2024-12-02 PROCEDURE — 1159F MED LIST DOCD IN RCRD: CPT | Mod: CPTII,S$GLB,, | Performed by: NURSE PRACTITIONER

## 2024-12-02 PROCEDURE — 1101F PT FALLS ASSESS-DOCD LE1/YR: CPT | Mod: CPTII,S$GLB,, | Performed by: STUDENT IN AN ORGANIZED HEALTH CARE EDUCATION/TRAINING PROGRAM

## 2024-12-02 NOTE — PROGRESS NOTES
SUBJECTIVE:     History of Present Illness    CHIEF COMPLAINT:  - Gavin presents today for initial evaluation of bilateral knee pain, with the left knee being more problematic than the right.    HPI:  Gavni presents with bilateral knee pain, worse in the left knee than the right, which has been ongoing for a few years. He reports difficulty walking and standing for prolonged periods, stating it is no longer possible for him. He describes episodes where his knees start shaking and give way while standing, and notes visible irritation on both knees.    He recalls injuring his left knee about 5-6 years ago, leading to swelling and two instances of knee aspiration, once at a clinic and once at [facility] Since then, he has experienced recurrent swelling and pain. Gavin reports that running is no longer possible. He describes popping sounds in his knees, especially when using rising from sitting position.    On a pain scale of 0-10, he rates his daily pain as a 3, but mentions it can increase to a 7 after certain activities, such as a recent visit to his PCP for an X-ray. Gavin also reports occasional hip and back pain, particularly when sitting for too long or in certain positions. His knees rarely give out while walking, but it has happened recently.    He expresses concern about inactivity contributing to his condition, stating he spends most of the day at home. He reports being a former  for over 20 years and previously being very active, walking long distances regularly. Gavin is a smoker and mentions missing teeth, indicating potential dental issues.    Gavin denies daily knee giving out, constant knee pain, or pain in the groin area. Gavin denies any fractures in his knees.    MEDICATIONS:  - Diclofenac tablets: Twice daily, recently prescribed by PCP, 30-day supply with 2 refills  - Ibuprofen 800 mg: Twice daily for over 10 years in the past, discontinued    FAMILY  HISTORY:  - Father: Lived to be 92 years old  - Some relatives (uncles): Lived to be 100-105 years old, indicating longevity in the family    WORK STATUS:  - Retired. 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  - Prior to that, worked as a  for over 20 years  - Currently mostly inactive, spending most of the day at home    SOCIAL HISTORY:  - Smoking: Current smoker    ROS:  Constitutional: -fever, -chills  Eyes: -visual changes  ENT: -nasal congestion, -sore throat  Respiratory: -cough, -shortness of breath  Cardiovascular: -chest pain, -palpitations  Gastrointestinal: -nausea, -vomiting  Genitourinary: -hematuria, -dysuria  Integument/Breast: -rash, -pruritus, -breast skin changes  Hematologic/Lymphatic: -easy bruising, -lymphadenopathy  Musculoskeletal: +arthralgia, +myalgia  Neurological: -seizures, -tremors  Behavioral/Psych: -hallucinations  Endocrine: -heat intolerance, -cold intolerance         Review of patient's allergies indicates:  No Known Allergies      Current Outpatient Medications   Medication Sig Dispense Refill    diclofenac (VOLTAREN) 50 MG EC tablet Take 1 tablet (50 mg total) by mouth 2 (two) times daily. 60 tablet 2    levothyroxine (SYNTHROID, LEVOTHROID) 175 MCG tablet Take 1 tablet (175 mcg total) by mouth every morning. 90 tablet 3    tamsulosin (FLOMAX) 0.4 mg Cap Take 2 capsules (0.8 mg total) by mouth once daily. 180 capsule 3     Current Facility-Administered Medications   Medication Dose Route Frequency Provider Last Rate Last Admin    LIDOcaine HCL 10 mg/ml (1%) injection 10 mL  10 mL Infiltration 1 time in Clinic/HOD         LIDOcaine HCl 2% urojet   Rectal 1 time in Clinic/HOD            Past Medical History:   Diagnosis Date    BPH (benign prostatic hyperplasia)     Disorder of thyroid, unspecified        No past surgical history on file.    No family history on file.      OBJECTIVE:     PHYSICAL  "EXAM:  Vital Signs (Most Recent)  There were no vitals filed for this visit.     ,   Estimated body mass index is 23.67 kg/m² as calculated from the following:    Height as of 11/25/24: 5' 10.5" (1.791 m).    Weight as of 11/25/24: 75.9 kg (167 lb 5.3 oz).   General Appearance: Well nourished, well developed, in no acute distress.  HENT: Normal cephalic, oropharynx pink and moist  Eyes: PERRLA bilaterally and EOM x 4  Respiratory: Even and unlabored  Skin: Warm and Dry.   Psychiatric: AAO x 4, Mood & affect are normal.    Physical Exam    MSK: Knee - Left: Left knee range of motion: 0 to 120 degrees. Left leg muscle strength: 5/5. No pain with log roll on left leg. No pain with axial loading on left leg.  Cardiovascular: bilateral pedal pulses: 2+ x2.  MSK: Knee - Right: Right leg muscle strength: 5/5. Right knee range of motion: 0 to 120 degrees. No pain with log roll on right leg. No pain with axial loading on right leg.  Mouth: Missing teeth.  IMAGING:  - X-ray left knee: There is some joint space when fully erect and standing, Tibia spine hits up at the distal femur, When slightly bent, the patella rubs on the femur.  These findings are consistent with DJD. Radiologist read confirms it as mild degenerative joint disease (DJD) with narrowing of the patellofemoral and medial tibial femoral joint space, No fractures observed       All radiographs were personally reviewed by me.    ASSESSMENT/PLAN:       ICD-10-CM ICD-9-CM   1. Primary osteoarthritis of left knee  M17.12 715.16     Assessment & Plan    LIFESTYLE:  - Advised against prolonged inactivity to prevent muscle loss and weakness.    MEDICATIONS PRESCRIBED:  - Continue taking Diclofenac tablets as prescribed by PCP (twice daily). Recommend adding OTC Tylenol as needed for pain. Suggested trying topical treatments like Voltaren gel, Aspercreme, or Lidocaine cream.    RETURN TO ACTIVITY:  - Advised to try walking at least 4-5 blocks a day in the park every " other day or every third day to increase activity level. Recommend using a pool for exercise to reduce pressure on knees. Suggested trying pool exercises to reduce pressure on knees while moving.    PROCEDURES:  - Discussed potential knee injections as a non-surgical option, but patient expressed desire to avoid them. Discussed possibility of knee replacement surgery as a future option if conservative treatments fail.    FOLLOW UP:  - Return if symptoms do not improve with current treatment plan.         This note was generated with the assistance of ambient listening technology. Verbal consent was obtained by the patient and accompanying visitor(s) for the recording of patient appointment to facilitate this note. I attest to having reviewed and edited the generated note for accuracy, though some syntax or spelling errors may persist. Please contact the author of this note for any clarification.

## 2024-12-02 NOTE — PROGRESS NOTES
Gavin Balderrama is a pleasant 68 y.o. male presenting for management of elevated PSA.     HPI:   It was great to see Gavin today.  He was previously seen by Dr. Solorio. He has a PSA density close to 1 and was planned for a biopsy despite only a PIRADS 2 on MRI, which is appropriate. Unfortunately he missed multiple scheduled biopsy appointments and is now seeking a new provider for a biopsy. Currently, he denies hematuria, dysuria, fevers or renal colic.   He has a family history of prostate cancer.     Past Medical History:   Diagnosis Date    BPH (benign prostatic hyperplasia)     Disorder of thyroid, unspecified       No past surgical history on file.  ROS: as per HPI    Social History     Tobacco Use   Smoking Status Not on file   Smokeless Tobacco Not on file        Physical Exam     General: No acute distress. Nontoxic appearing.  HENT: Normocephalic. Atraumatic.  Respiratory: Normal respiratory effort. No conversational dyspnea. No audible wheezing.  Abdomen: No obvious distension.  Skin: No visible abnormalities.  Extremities: No edema upper extremities. No edema lower extremities.  Neurological: Alert and oriented x3. Normal speech.  Psychiatric: Normal mood. Normal affect. No evidence of SI.     Data review  Prior internal/external notes have been reviewed: Yes  Care Everywhere reviewed for external records:  Yes    Pertinent labs and imaging independently reviewed include:   Lab Results   Component Value Date     07/10/2024    K 4.4 07/10/2024    CREATININE 1.2 07/10/2024    EGFRNORACEVR >60.0 07/10/2024     Lab Results   Component Value Date    HGBA1C 4.9 07/10/2024      PSA:   29.3 ng/mL on 11/25/2024  24.4 ng/mL on 08/26/2024   17.6 ng/mL on 07/10/2024     Prostate MRI demonstrates a 30 cc gland.  PI-RADS 2.  Questionable prostatitis given patient's lack of symptoms currently.    Problems addressed during today's encounter  Problem List Items Addressed This Visit          Renal/    BPH with  urinary obstruction - Primary    Elevated PSA       Other    Tobacco abuse        Plan for problems listed above includes:   Patient would like to have a prostate biopsy and I feel this is appropriate.  I will refer him to 1 of my partners for consideration of systematic versus MRI fusion biopsy.  Continue Flomax for BPH/LUTS.  We discussed his smoking today.  He is not ready to quit smoking and declines referral to the smoking cessation program. A minimum of 3 minutes of today's visit was spent counseling the patient on tobacco cessation.    Risk for nontreatment of mentioned condition(s) includes, but is not limited to:   Continuation or worsening of the current condition(s)  Possible missed diagnosis of malignancy    Patient risk factors for management (e.g., age, history, comorbidities) include:   Age, family history.     Treatment requiring monitoring for toxicity includes:   Oral or topical medication    Further evaluation ordered today:   Consultation with another provider    Dictation is typically used for these notes, such that spelling/grammatical errors may be related to interpretation of spoken word.    Selvin Daniels MD

## 2024-12-03 DIAGNOSIS — R97.20 ELEVATED PSA: Primary | ICD-10-CM

## 2024-12-03 RX ORDER — SODIUM PHOSPHATE,MONO-DIBASIC 19G-7G/197
1 ENEMA (ML) RECTAL ONCE
Qty: 1 ENEMA | Refills: 0 | Status: SHIPPED | OUTPATIENT
Start: 2024-12-03 | End: 2024-12-03

## 2024-12-03 RX ORDER — CIPROFLOXACIN 500 MG/1
TABLET ORAL
Qty: 1 TABLET | Refills: 0 | Status: SHIPPED | OUTPATIENT
Start: 2024-12-03

## 2025-01-03 ENCOUNTER — TELEPHONE (OUTPATIENT)
Dept: UROLOGY | Facility: CLINIC | Age: 70
End: 2025-01-03
Payer: MEDICARE

## 2025-01-03 NOTE — TELEPHONE ENCOUNTER
Tried calling pt, mailbox full  LMOR for friend, Juany Riggs,  re: confirming procedure appt for tomorrow.   Discussed location, arrival time for 1:30 & instructions re: enema & ABX & no blood thinners.   Also sent a Davion msg to pt with info.

## 2025-01-10 NOTE — PROGRESS NOTES
Gavin Balderrama presented for an initial Medicare AWV today. The following components were reviewed and updated:  Pleasant gentleman.     Medical history  Family History  Social history  Allergies and Current Medications  Health Risk Assessment  Health Maintenance  Care Team    **See Completed Assessments for Annual Wellness visit with in the encounter summary    The following assessments were completed:  Depression Screening  Cognitive function Screening    Timed Get Up Test  Whisper Test      Opioid documentation:  Patient does not have a current opioid prescription.        Wt Readings from Last 3 Encounters:   01/17/25 80 kg (176 lb 5.9 oz)   12/02/24 75 kg (165 lb 5.5 oz)   11/25/24 75.9 kg (167 lb 5.3 oz)     Temp Readings from Last 3 Encounters:   No data found for Temp     BP Readings from Last 3 Encounters:   01/17/25 (!) 140/102   12/02/24 127/76   11/25/24 116/78     Pulse Readings from Last 3 Encounters:   01/17/25 90   12/02/24 78   11/25/24 80       Physical Exam  General: Well developed, well nourished. No distress.  HEENT: Head is normocephalic, atraumatic   Eyes: Clear conjunctiva.  Neck: Supple, symmetrical neck; trachea midline.  Extremities: No LE edema. Pulses 2+ and symmetric.   Skin: Skin color, texture, turgor normal. No rashes.  Musculoskeletal: Normal gait.   Neurologic: Normal strength and tone. No focal numbness or weakness.     Diagnoses and health risks identified today and associated recommendations/orders:  1 Encounter for preventive health examination  - Ambulatory Referral/Consult to Enhanced Annual Wellness Visit (eAWV)     2 Atherosclerosis  Chronic, stable. Followed by PCP    3 Cocaine abuse  Chronic. Noted the visit with Dr. Korina raymond and his sister (Ms. Mauro) in 11/2024 and will be following up with Dr Hui on 1/27/2025.     4 Tobacco abuse  Discussed cessation. Chronic. Followed by Dr. Hui    5 Other specified hypothyroidism  Lab Results   Component Value Date    TSH 0.832  07/10/2024   Chronic, stable. On replacement therapy. Followed by PCP    6 Elevated PSA  Noted level of 29. Progressively increased. Pending appointment for 'biopsy' next month. Followed by Urology and PCP    7 BPH with urinary obstruction  Chronic, stable. On Flomax therapy. Followed by Urology and PCP     8 Other reduced mobility  Chronic, stable. Followed by PCP    Provided Gavin with a 5-10 year written screening schedule and personal prevention plan. Recommendations were developed using the USPSTF age appropriate recommendations. Education, counseling, and referrals were provided as needed.  After Visit Summary printed and given to patient which includes a list of additional screenings\tests needed.    Future Appointments   Date Time Provider Department Center   1/27/2025  2:30 PM PROCEDURE, UROLOGY ROOM 2 [facility]   2/21/2025  9:20 AM Stephan Hui Jr., MD [facility]        Medication List            Accurate as of January 17, 2025 11:03 AM. If you have any questions, ask your nurse or doctor.                CONTINUE taking these medications      ciprofloxacin HCl 500 MG tablet  Commonly known as: CIPRO  1 pill one hour before procedure     diclofenac 50 MG EC tablet  Commonly known as: VOLTAREN  Take 1 tablet (50 mg total) by mouth 2 (two) times daily.     levothyroxine 175 MCG tablet  Commonly known as: SYNTHROID, LEVOTHROID  Take 1 tablet (175 mcg total) by mouth every morning.     tamsulosin 0.4 mg Cap  Commonly known as: FLOMAX  Take 2 capsules (0.8 mg total) by mouth once daily.            ROBBIE Dixon      I offered to discuss advanced care planning, including how to pick a person who would make decisions for you if you were unable to make them for yourself, called a health care power of , and what kind of decisions you might make such as use of life sustaining treatments such as ventilators and tube feeding when faced with a life limiting illness  recorded on a living will that they will need to know. (How you want to be cared for as you near the end of your natural life)     X Patient is interested in learning more about how to make advanced directives.  I provided them paperwork and offered to discuss this with them. (Zunilda, Sister and Juany, Friend)

## 2025-01-16 ENCOUNTER — TELEPHONE (OUTPATIENT)
Dept: ADMINISTRATIVE | Facility: CLINIC | Age: 70
End: 2025-01-16
Payer: MEDICARE

## 2025-01-16 PROBLEM — I70.90 ATHEROSCLEROSIS: Status: ACTIVE | Noted: 2025-01-16

## 2025-01-16 NOTE — TELEPHONE ENCOUNTER
Called pt, informed pt I was calling to remind pt of his in office EAWV on 1/17/25; clinic location provided to patient; pt confirmed appointment

## 2025-01-17 ENCOUNTER — OFFICE VISIT (OUTPATIENT)
Dept: INTERNAL MEDICINE | Facility: CLINIC | Age: 70
End: 2025-01-17
Payer: MEDICARE

## 2025-01-17 VITALS
BODY MASS INDEX: 24.69 KG/M2 | SYSTOLIC BLOOD PRESSURE: 140 MMHG | HEIGHT: 71 IN | OXYGEN SATURATION: 93 % | DIASTOLIC BLOOD PRESSURE: 102 MMHG | WEIGHT: 176.38 LBS | HEART RATE: 90 BPM

## 2025-01-17 DIAGNOSIS — Z74.09 OTHER REDUCED MOBILITY: ICD-10-CM

## 2025-01-17 DIAGNOSIS — N40.1 BPH WITH URINARY OBSTRUCTION: ICD-10-CM

## 2025-01-17 DIAGNOSIS — I70.90 ATHEROSCLEROSIS: ICD-10-CM

## 2025-01-17 DIAGNOSIS — E03.8 OTHER SPECIFIED HYPOTHYROIDISM: ICD-10-CM

## 2025-01-17 DIAGNOSIS — Z72.0 TOBACCO ABUSE: ICD-10-CM

## 2025-01-17 DIAGNOSIS — N13.8 BPH WITH URINARY OBSTRUCTION: ICD-10-CM

## 2025-01-17 DIAGNOSIS — Z00.00 ENCOUNTER FOR PREVENTIVE HEALTH EXAMINATION: Primary | ICD-10-CM

## 2025-01-17 DIAGNOSIS — R97.20 ELEVATED PSA: ICD-10-CM

## 2025-01-17 DIAGNOSIS — F14.10 COCAINE ABUSE: ICD-10-CM

## 2025-01-17 PROCEDURE — 1158F ADVNC CARE PLAN TLK DOCD: CPT | Mod: CPTII,S$GLB,, | Performed by: NURSE PRACTITIONER

## 2025-01-17 PROCEDURE — 99999 PR PBB SHADOW E&M-EST. PATIENT-LVL IV: CPT | Mod: PBBFAC,,, | Performed by: NURSE PRACTITIONER

## 2025-01-17 PROCEDURE — G0439 PPPS, SUBSEQ VISIT: HCPCS | Mod: S$GLB,,, | Performed by: NURSE PRACTITIONER

## 2025-01-17 PROCEDURE — 1125F AMNT PAIN NOTED PAIN PRSNT: CPT | Mod: CPTII,S$GLB,, | Performed by: NURSE PRACTITIONER

## 2025-01-17 PROCEDURE — 1101F PT FALLS ASSESS-DOCD LE1/YR: CPT | Mod: CPTII,S$GLB,, | Performed by: NURSE PRACTITIONER

## 2025-01-17 PROCEDURE — 3288F FALL RISK ASSESSMENT DOCD: CPT | Mod: CPTII,S$GLB,, | Performed by: NURSE PRACTITIONER

## 2025-01-17 PROCEDURE — 1170F FXNL STATUS ASSESSED: CPT | Mod: CPTII,S$GLB,, | Performed by: NURSE PRACTITIONER

## 2025-01-17 PROCEDURE — 3077F SYST BP >= 140 MM HG: CPT | Mod: CPTII,S$GLB,, | Performed by: NURSE PRACTITIONER

## 2025-01-17 PROCEDURE — 3080F DIAST BP >= 90 MM HG: CPT | Mod: CPTII,S$GLB,, | Performed by: NURSE PRACTITIONER

## 2025-01-17 PROCEDURE — 1159F MED LIST DOCD IN RCRD: CPT | Mod: CPTII,S$GLB,, | Performed by: NURSE PRACTITIONER

## 2025-01-17 PROCEDURE — 1160F RVW MEDS BY RX/DR IN RCRD: CPT | Mod: CPTII,S$GLB,, | Performed by: NURSE PRACTITIONER

## 2025-01-17 NOTE — PATIENT INSTRUCTIONS
Counseling and Referral of Other Preventative  (Italic type indicates deductible and co-insurance are waived)    Patient Name: Gavin Balderrama  Today's Date: 1/17/2025    Health Maintenance       Date Due Completion Date    Colorectal Cancer Screening Never done ---    Shingles Vaccine (1 of 2) Never done ---    Influenza Vaccine (1) Never done ---    COVID-19 Vaccine (3 - 2024-25 season) 09/01/2024 4/1/2021    Lipid Panel 07/10/2029 7/10/2024    RSV Vaccine (Age 60+ and Pregnant patients) (1 - 1-dose 75+ series) 12/21/2029 ---    TETANUS VACCINE 07/10/2034 7/10/2024        No orders of the defined types were placed in this encounter.      The following information is provided to all patients.  This information is to help you find resources for any of the problems found today that may be affecting your health:                  Living healthy guide: www.[facility].[place].[facility]      Understanding Diabetes: www.diabetes.org      Eating healthy: www.cdc.gov/healthyweight      CDC home safety checklist: www.cdc.gov/steadi/patient.html      Agency on Aging: www.goea.[place].[facility]      Alcoholics anonymous (AA): www.aa.org      Physical Activity: www.merrick.nih.gov/wo7rani      Tobacco use: www.quitwithusla.org

## 2025-01-24 ENCOUNTER — TELEPHONE (OUTPATIENT)
Dept: UROLOGY | Facility: CLINIC | Age: 70
End: 2025-01-24
Payer: MEDICARE

## 2025-01-24 NOTE — TELEPHONE ENCOUNTER
Spoke with pt re: confirming procedure appt for tomorrow.   Discussed location, arrival time for 2:15 pm & instructions re: enema & ABX & no blood thinners.   Pt verbalized understanding

## 2025-01-27 ENCOUNTER — PROCEDURE VISIT (OUTPATIENT)
Dept: UROLOGY | Facility: CLINIC | Age: 70
End: 2025-01-27
Payer: MEDICARE

## 2025-01-27 VITALS
RESPIRATION RATE: 17 BRPM | SYSTOLIC BLOOD PRESSURE: 127 MMHG | DIASTOLIC BLOOD PRESSURE: 76 MMHG | TEMPERATURE: 97 F | WEIGHT: 174.63 LBS | BODY MASS INDEX: 24.35 KG/M2 | HEART RATE: 77 BPM

## 2025-01-27 DIAGNOSIS — R97.20 ELEVATED PSA: ICD-10-CM

## 2025-01-27 PROCEDURE — 88305 TISSUE EXAM BY PATHOLOGIST: CPT | Performed by: PATHOLOGY

## 2025-01-27 RX ORDER — CEFTRIAXONE 1 G/1
1 INJECTION, POWDER, FOR SOLUTION INTRAMUSCULAR; INTRAVENOUS
Status: COMPLETED | OUTPATIENT
Start: 2025-01-27 | End: 2025-01-27

## 2025-01-27 RX ADMIN — LIDOCAINE HYDROCHLORIDE 10 ML: 10 INJECTION, SOLUTION INFILTRATION; PERINEURAL at 03:01

## 2025-01-27 RX ADMIN — CEFTRIAXONE 1 G: 1 INJECTION, POWDER, FOR SOLUTION INTRAMUSCULAR; INTRAVENOUS at 02:01

## 2025-01-27 RX ADMIN — LIDOCAINE HYDROCHLORIDE: 20 JELLY TOPICAL at 02:01

## 2025-01-27 NOTE — PROCEDURES
"Transrectal Ultrasound w/ Biopsy    Date/Time: 1/27/2025 2:30 PM    Performed by: Jayce Laguerre MD  Authorized by: Jayce Laguerre MD    Total Biopsies:  0      PROCEDURE: TRANSRECTAL ULTRASONOGRAPHY OF THE PROSTATE AND SEMINAL VESICLES and PROSTATE BIOPSY    INDICATION:    Elevated PSA    We then discussed risks, benefits, alternatives specific to this procedure, including complications, detailed below:  -Prostate biopsy: I discussed complications such as pain, blood in the urine, blood in the semen, and blood in the stool, the risk of urinary tract infection, urinary retention, prostatitis, and sepsis.    ANTIBIOTICS:  P.o. Cipro and Rocephin intramuscularly    NARRATIVE:  Upon entering the surgical suite, a "time out" was called and the correct patient, body site and surgical procedure was verified with the nursing staff, physician and patient according to the [facility] protocol.  After informed consent, the patient was placed in the modified lateral fetal position and the ultrasound probe inserted per rectum in standard fashion. Realtime ultrasonography and hard copies of the prostate, seminal vesicles, bladder, and posterior urethra were obtained in both transverse and sagittal dimensions.  Following this ultrasound guidance was used to take core needle biopsies of the prostate. Findings include:     Number of cores taken: 12 core, extended sextant, cores sent to [facility] pathology for review    All biopsies were performed in the sagittal approach     SEMINAL VESICLES/VASA:  Normal in size, shape, and echotexture.  No mass or cyst seen.  Vasa appear unremarkable     BLADDER:  No intravesical mass was appreciated; no significant wall thickening noted.  A median lobe was not present.     ANGIE:  No palpable masses or tumors    PROSTATE:  The dimensions were 2.7 cm height x 4.3 cm width x 5.2 cm length (last dimension is sagittal).  The estimated prostate volume is 32.1 cc.  PSAD:  0.91.  The prostate otherwise " appeared normal in shape and countour    PERIPROSTATIC FAT/PERIRECTAL SPACE: Periprostatic fat appear unremarkable and perirectal space is within normal limits.     URETHRA AND GENITOURINARY SPHINCTER:  Normal echotexture     Will call patient to discuss pathology results when becomes available, will book appointment in 2 weeks for placeholder

## 2025-01-27 NOTE — PATIENT INSTRUCTIONS
What to Expect After a Prostate Biopsy    You may have mild bleeding from the rectum or urine for about 1 week to 1 month, or in your ejaculate for several months. This bleeding is normal and expected, and it will stop. You may have mild discomfort in your rectal or urethral area for 24-48 hours.    You cannot do any strenuous lifting, straining, or exercising for 24 hours. You may return to full activity the day after the biopsy.    You may continue to take all your regular medications after the procedure except for the blood thinners.    You may resume all blood-thinning medications once you no longer see any bleeding or whenever your physician prescribing the medication says it is all right to do so. You may take Tylenol if you have a fever and your temperature is less than 100° F or if you have some discomfort.    You will receive a call from the Urology Department at [facility] with the results of your prostate biopsy within one week.    Signs and Symptoms to Report    Call your [facility] urologist at [phone] if you develop any of the following:  Temperature greater than 101°  F  Inability to urinate  A large amount of bleeding from the rectum or in the urine  Persistent or severe pain    After hours or on weekends, you may reach a urology resident on call at this number: [phone].

## 2025-01-30 DIAGNOSIS — C61 PROSTATE CANCER: Primary | ICD-10-CM

## 2025-01-30 LAB
FINAL PATHOLOGIC DIAGNOSIS: NORMAL
GROSS: NORMAL
Lab: NORMAL

## 2025-02-11 ENCOUNTER — HOSPITAL ENCOUNTER (OUTPATIENT)
Dept: RADIOLOGY | Facility: HOSPITAL | Age: 70
Discharge: HOME | End: 2025-02-11
Attending: UROLOGY
Payer: MEDICARE

## 2025-02-11 DIAGNOSIS — C61 PROSTATE CANCER: ICD-10-CM

## 2025-02-11 PROCEDURE — 78815 PET IMAGE W/CT SKULL-THIGH: CPT | Mod: TC

## 2025-02-11 PROCEDURE — 78815 PET IMAGE W/CT SKULL-THIGH: CPT | Mod: 26,PI,, | Performed by: STUDENT IN AN ORGANIZED HEALTH CARE EDUCATION/TRAINING PROGRAM

## 2025-02-11 PROCEDURE — A9595 HC PIFLUFOLASTAT F-18, DX, PER 1 MCI: HCPCS | Mod: TB | Performed by: UROLOGY

## 2025-02-11 RX ADMIN — PIFLUFOLASTAT F-18 8.27 MILLICURIE: 80 INJECTION INTRAVENOUS at 02:02

## 2025-02-14 ENCOUNTER — TELEPHONE (OUTPATIENT)
Dept: HEMATOLOGY/ONCOLOGY | Facility: CLINIC | Age: 70
End: 2025-02-14
Payer: MEDICARE

## 2025-02-14 NOTE — NURSING
Nurse navigator spoke with patient to coordinate appt with Dr. Sheridan on 2/20/25.  Patient states understanding & appt letter mailed to patients home.  All questions answered and contact info given for any future questions.

## 2025-02-17 ENCOUNTER — TELEPHONE (OUTPATIENT)
Dept: PHARMACY | Facility: CLINIC | Age: 70
End: 2025-02-17
Payer: MEDICARE

## 2025-02-17 NOTE — TELEPHONE ENCOUNTER
[facility]/Population Health Chart Review & Patient Outreach Details For Medication Adherence Project    Reason for Outreach Encounter: 3rd Party payor non-compliance report ([employer], [employer], UHC, etc)  2.  Patient Outreach Method: Reviewed patient chart   3.   Medication in question:  None       4.  Reviewed and or Updates Made To: Patient Chart  5. Outreach Outcomes and/or actions taken: N/A  Additional Notes:

## 2025-02-19 NOTE — PROGRESS NOTES
[facility]: New patient visit  Best Contact Phone Number(s): [phone] (home)       Cancer/Stage/TNM:    Cancer Staging   Primary adenocarcinoma of prostate  Staging form: Prostate, AJCC 8th Edition  - Clinical: Stage JANIE (cT1c, cN1, cM0, PSA: 35.1, Grade Group: 3) - Signed by Orion Sheridan MD on 2/20/2025        Reason for visit: Regional node positive prostate cancer (T1cN1, Farzana 4+3; PSA 29.3)    Molecular:  Germline Testing: TBD    Treatment History:   N/A     HPI:   Gavin Balderrama is a 69 y.o. male with regional node positive prostate cancer. Initially diagnosed with Hindman 4+3 disease 01/2025 in setting of elevated PSA to 29.3 and chronic obstructive LUTS. PSMA PET imaging 02/2025 with low volume PSMA avid iliac nodes. He presents to medical oncology clinic for initial evaluation.    Patient reports frequent nocturia with moderate obstructive symptoms including urinary hesitancy. Reports nocturia up to 4x now. Has been on tamsulosin with modest improvement.  No other signficant complaints.     Has been generally healthy otherwise, but is generally sedentary. He is limited by chronic pain in his knees he attributes to OA.  He takes levothyroxine for low thyroid levels. Also on tamsulosin for LUTS. Severe emphysema noted on CT imaging 07/15/24. No known MI or stroke.     Plan:  Lower volume but PSMA avid pelvic nodes. Will treat as regional node positive disease. He is seeing radiation oncology and urology next week. Presuming he elects for definitive RT treatment will start ADT + abiraterone-prednisone x24 months.     -- Plan to start ADT + AAP  -- RX for abiraterone + prednisone 250/5 sent to [facility]  -- FU 2 weeks for first Lupron shot  -- Plan to start AAP in 2 weeks with Lupron  -- Safety labs q2 weeks x2 month after starting AAP  -- Start Ca/D; schedule DEXA  -- Will discuss sending germline genetics via [employer] xG at follow up        Follow up:   Route Chart for  Scheduling    Med Onc Chart Routing      Follow up with physician 6 weeks.   Follow up with [facility] 2 weeks.   Infusion scheduling note    Injection scheduling note Lupron 2 weeks   Labs CBC, CMP and PSA   Scheduling:  Preferred lab:  Lab interval:     Imaging    Pharmacy appointment    Other referrals                     Supportive Plan Information  OP PROSTATE LEUPROLIDE Q3MO Orion Sheridan MD   Associated Diagnosis: Primary adenocarcinoma of prostate Stage JANIE cT1c, cN1, cM0, PSA: 35.1, Grade Group: 3 noted on 2/20/2025   Line of treatment: First Line   Treatment goal: Curative     Upcoming Treatment Dates - OP PROSTATE LEUPROLIDE Q3MO    3/6/2025       Chemotherapy       leuprolide (LUPRON) injection 22.5 mg  5/29/2025       Chemotherapy       leuprolide (LUPRON) injection 22.5 mg  8/21/2025       Chemotherapy       leuprolide (LUPRON) injection 22.5 mg  11/13/2025       Chemotherapy       leuprolide (LUPRON) injection 22.5 mg         The above information has been reviewed with the patient and all questions have been answered to their apparent satisfaction.  They understand that they can call the clinic with any questions.    Orion Sheridan MD MPH  Staff Physician     [facility] [address] [address]  Email: [email or web address]  Phone: o) [phone] (c) [phone]

## 2025-02-20 ENCOUNTER — LAB VISIT (OUTPATIENT)
Dept: LAB | Facility: HOSPITAL | Age: 70
End: 2025-02-20
Attending: HOSPITALIST
Payer: MEDICARE

## 2025-02-20 ENCOUNTER — OFFICE VISIT (OUTPATIENT)
Dept: HEMATOLOGY/ONCOLOGY | Facility: CLINIC | Age: 70
End: 2025-02-20
Payer: MEDICARE

## 2025-02-20 VITALS
HEART RATE: 74 BPM | RESPIRATION RATE: 18 BRPM | SYSTOLIC BLOOD PRESSURE: 144 MMHG | OXYGEN SATURATION: 100 % | WEIGHT: 171.88 LBS | BODY MASS INDEX: 24.06 KG/M2 | HEIGHT: 71 IN | DIASTOLIC BLOOD PRESSURE: 76 MMHG

## 2025-02-20 DIAGNOSIS — Z79.818 ANDROGEN DEPRIVATION THERAPY: ICD-10-CM

## 2025-02-20 DIAGNOSIS — C61 PRIMARY ADENOCARCINOMA OF PROSTATE: ICD-10-CM

## 2025-02-20 DIAGNOSIS — Z79.899 LONG TERM CURRENT USE OF THERAPEUTIC DRUG: ICD-10-CM

## 2025-02-20 DIAGNOSIS — C61 PRIMARY ADENOCARCINOMA OF PROSTATE: Primary | ICD-10-CM

## 2025-02-20 LAB
ALBUMIN SERPL BCP-MCNC: 4.4 G/DL (ref 3.5–5.2)
ALP SERPL-CCNC: 93 U/L (ref 40–150)
ALT SERPL W/O P-5'-P-CCNC: 14 U/L (ref 10–44)
ANION GAP SERPL CALC-SCNC: 8 MMOL/L (ref 8–16)
AST SERPL-CCNC: 37 U/L (ref 10–40)
BILIRUB SERPL-MCNC: 0.4 MG/DL (ref 0.1–1)
BUN SERPL-MCNC: 20 MG/DL (ref 8–23)
CALCIUM SERPL-MCNC: 10.4 MG/DL (ref 8.7–10.5)
CHLORIDE SERPL-SCNC: 105 MMOL/L (ref 95–110)
CO2 SERPL-SCNC: 27 MMOL/L (ref 23–29)
COMPLEXED PSA SERPL-MCNC: 35.1 NG/ML (ref 0–4)
CREAT SERPL-MCNC: 1.3 MG/DL (ref 0.5–1.4)
ERYTHROCYTE [DISTWIDTH] IN BLOOD BY AUTOMATED COUNT: 13 % (ref 11.5–14.5)
EST. GFR  (NO RACE VARIABLE): 59.1 ML/MIN/1.73 M^2
GLUCOSE SERPL-MCNC: 72 MG/DL (ref 70–110)
HCT VFR BLD AUTO: 49.9 % (ref 40–54)
HGB BLD-MCNC: 16.6 G/DL (ref 14–18)
IMM GRANULOCYTES # BLD AUTO: 0.02 K/UL (ref 0–0.04)
MCH RBC QN AUTO: 35 PG (ref 27–31)
MCHC RBC AUTO-ENTMCNC: 33.3 G/DL (ref 32–36)
MCV RBC AUTO: 105 FL (ref 82–98)
NEUTROPHILS # BLD AUTO: 1.9 K/UL (ref 1.8–7.7)
PLATELET # BLD AUTO: 348 K/UL (ref 150–450)
PMV BLD AUTO: 9.5 FL (ref 9.2–12.9)
POTASSIUM SERPL-SCNC: 4.2 MMOL/L (ref 3.5–5.1)
PROT SERPL-MCNC: 8.3 G/DL (ref 6–8.4)
RBC # BLD AUTO: 4.74 M/UL (ref 4.6–6.2)
SODIUM SERPL-SCNC: 140 MMOL/L (ref 136–145)
TESTOST SERPL-MCNC: 625 NG/DL (ref 304–1227)
WBC # BLD AUTO: 4.18 K/UL (ref 3.9–12.7)

## 2025-02-20 PROCEDURE — 84403 ASSAY OF TOTAL TESTOSTERONE: CPT | Performed by: HOSPITALIST

## 2025-02-20 PROCEDURE — 84153 ASSAY OF PSA TOTAL: CPT | Performed by: HOSPITALIST

## 2025-02-20 PROCEDURE — 80053 COMPREHEN METABOLIC PANEL: CPT | Performed by: HOSPITALIST

## 2025-02-20 PROCEDURE — 36415 COLL VENOUS BLD VENIPUNCTURE: CPT | Performed by: HOSPITALIST

## 2025-02-20 PROCEDURE — 99999 PR PBB SHADOW E&M-EST. PATIENT-LVL III: CPT | Mod: PBBFAC,,, | Performed by: HOSPITALIST

## 2025-02-20 PROCEDURE — 85027 COMPLETE CBC AUTOMATED: CPT | Performed by: HOSPITALIST

## 2025-02-20 RX ORDER — ABIRATERONE ACETATE 250 MG/1
250 TABLET ORAL DAILY
Qty: 30 TABLET | Refills: 11 | Status: ACTIVE | OUTPATIENT
Start: 2025-02-20 | End: 2026-02-20

## 2025-02-20 RX ORDER — PREDNISONE 5 MG/1
5 TABLET ORAL DAILY
Qty: 90 TABLET | Refills: 3 | Status: SHIPPED | OUTPATIENT
Start: 2025-02-20

## 2025-02-20 RX ORDER — CALCIUM CITRATE/VITAMIN D3 500MG-12.5
1 TABLET,CHEWABLE ORAL 2 TIMES DAILY
Qty: 60 TABLET | Refills: 5 | Status: SHIPPED | OUTPATIENT
Start: 2025-02-20

## 2025-02-20 NOTE — PROGRESS NOTES
[facility]: New patient visit  Best Contact Phone Number(s): [phone] (home)       Cancer/Stage/TNM:    Cancer Staging   No matching staging information was found for the patient.        Reason for visit:  High risk prostate cancer (T1c, Farzana 4+3; PSA 29.3)    Molecular:  Germline Testing: N/A  Somatic Testing: N/A    Treatment History:   N/A     HPI:   Gavin Balderrama is a 69 y.o. male with hypothyroidism and OA.       09/30/24: Prostate MRI  Impression:  - Findings suggestive of prostatitis.  Benign prostatic hyperplasia.  No focal lesion suspicious for prostate malignancy.  - Overall Assessment: PI-RADS 2 - Low (clinically significant cancer is unlikely to be present).    PSA 11/25/2024: 29.3    01/27/25: Prostate biopsy  Prostate adenocarcinoma. 9/12 template cores. Up to Farzana 4+3    02/11/25 PSMA PET CT  Impression:  - Few tracer avid foci within the prostate.  Few bilateral subcentimeter iliac chain lymph nodes demonstrating mild tracer avidity, suspicious for metastatic foci.  No tracer avid lesions elsewhere.    Subjective:  At baseline has nocturia 4-5x/night. Also notes needing to urinate when he switches positions or stands up. Has difficulty initiating stream at times. Takes tamsulosin which does not help. Has OA in his knees which makes walking for a long time difficult. No back pain or other pain.       Lab Results   Component Value Date    PSADIAG 29.3 (H) 11/25/2024    PSADIAG 24.4 (H) 08/26/2024    PSADIAG 17.6 (H) 07/10/2024         History has been obtained by chart review and discussion with the patient.     Oncology History    No history exists.         Past Medical History:   Diagnosis Date    BPH (benign prostatic hyperplasia)     Disorder of thyroid, unspecified          History reviewed. No pertinent surgical history.      Review of patient's allergies indicates:   Allergen Reactions    Grass pollen-eliseo grass standard          Current Medications[1]    "  Objective:      Physical Exam:   BP (!) 144/76 (BP Location: Left arm, Patient Position: Sitting)   Pulse 74   Resp 18   Ht 5' 11" (1.803 m)   Wt 78 kg (171 lb 13.6 oz)   SpO2 100%   BMI 23.97 kg/m²       ECOG Performance status: (1) Restricted in physically strenuous activity, ambulatory and able to do work of light nature     Physical Exam  Constitutional:       Appearance: Normal appearance.   HENT:      Head: Normocephalic and atraumatic.   Eyes:      Extraocular Movements: Extraocular movements intact.      Pupils: Pupils are equal, round, and reactive to light.   Cardiovascular:      Rate and Rhythm: Normal rate and regular rhythm.   Pulmonary:      Effort: Pulmonary effort is normal.      Breath sounds: Normal breath sounds.   Abdominal:      General: Abdomen is flat.      Palpations: Abdomen is soft.   Musculoskeletal:         General: No swelling. Normal range of motion.      Cervical back: Normal range of motion and neck supple.   Skin:     General: Skin is warm and dry.   Neurological:      General: No focal deficit present.      Mental Status: He is alert and oriented to person, place, and time.   Psychiatric:         Mood and Affect: Mood normal.         Behavior: Behavior normal.          Recent Labs:   Lab Results   Component Value Date    WBC 2.44 (L) 07/10/2024    RBC 4.18 (L) 07/10/2024    HGB 14.7 07/10/2024    HCT 45.6 07/10/2024     07/10/2024     07/10/2024    K 4.4 07/10/2024     07/10/2024    CO2 30 (H) 07/10/2024    GLU 71 07/10/2024    BUN 17 07/10/2024    CREATININE 1.2 07/10/2024    CALCIUM 9.7 07/10/2024    BILITOT 0.4 07/10/2024    AST 19 07/10/2024    ALT 13 07/10/2024          Lab Results   Component Value Date    PSADIAG 29.3 (H) 11/25/2024    PSADIAG 24.4 (H) 08/26/2024    PSADIAG 17.6 (H) 07/10/2024        Cardiovascular Screening:  Primary care physician: Stephan Hui Jr., MD      The 10-year ASCVD risk score (Madhavi MCDONALD, et al., 2019) is: 20.6%    " "Values used to calculate the score:      Age: 69 years      Sex: Male      Is Non- : Yes      Diabetic: No      Tobacco smoker: Yes      Systolic Blood Pressure: 144 mmHg      Is BP treated: No      HDL Cholesterol: 63 mg/dL      Total Cholesterol: 152 mg/dL    EKG:   Results for orders placed or performed during the hospital encounter of 07/15/24   SCHEDULED EKG 12-LEAD (to Muse)    Collection Time: 07/15/24 12:22 PM   Result Value Ref Range    QRS Duration 72 ms    [facility] QTC Calculation 420 ms    Narrative    Test Reason : R00.1,    Vent. Rate : 064 BPM     Atrial Rate : 064 BPM     P-R Int : 182 ms          QRS Dur : 072 ms      QT Int : 408 ms       P-R-T Axes : 078 063 079 degrees     QTc Int : 420 ms    Normal sinus rhythm  Low septal forces  Otherwise normal ECG  No previous ECGs available  Confirmed by Yobany Dexter MD ([id number]) on 7/15/2024 12:34:27 PM    Referred By: NICK MARIE           Confirmed By:Yobany Dexter MD       Body mass index is 23.97 kg/m².    Lab Results   Component Value Date    CHOL 152 07/10/2024    LDLCALC 80.2 07/10/2024    HDL 63 07/10/2024    TRIG 44 07/10/2024    HGBA1C 4.9 07/10/2024          Bone Health    No results found for: "[id number]", "FCBPGBMC44RC"     No results found for this or any previous visit.         PSMA PET IMAGING+     Results for orders placed during the hospital encounter of 02/11/25    NM PET CT F 18 PYL PSMA, Midthigh to Vertex    Impression  Few tracer avid foci within the prostate.  Few bilateral subcentimeter iliac chain lymph nodes demonstrating mild tracer avidity, suspicious for metastatic foci.  No tracer avid lesions elsewhere.    Electronically signed by resident: Melina Castillo  Date:    02/11/2025  Time:    16:37    Electronically signed by: Ren España  Date:    02/11/2025  Time:    17:27       I have personally reviewed the above imaging.     Path:   Reviewed pathology as documented above.      Diagnoses:     1. " Primary adenocarcinoma of prostate          Assessment and Plan:     1. Primary adenocarcinoma of prostate  Overview:  Farzana 4+3 on prostate biopsy 01/27/2025. PSMA PET with few tracer avid foci within the prostate and few bilateral subcentimeter iliac chain lymph nodes demonstrating mild tracer avidity.     Assessment & Plan:  - Start ADT and abiraterone+pred for 24 months  - Plan for radiation therapy  - Labs q2 weeks for first 2 months  - RTC 4 weeks  - Ca+ Vit D (takes vit D, Ca Rx sent)  - DEXA ordered  - Consider Tempus at next appointment            Follow up:   Route Chart for Scheduling    Med Onc Chart Routing      Follow up with physician    Follow up with [facility] 2 weeks. For Lupron   Infusion scheduling note    Injection scheduling note    Labs CBC, CMP and PSA   Scheduling:  Preferred lab:  Lab interval:  And testosterone; every 2 weeks for 2 months   Imaging DXA scan      Pharmacy appointment    Other referrals                           The above information has been reviewed with the patient and all questions have been answered to their apparent satisfaction.  They understand that they can call the clinic with any questions.      Eron Perdomo DO  Hematology/Oncology Fellow, PGY-IV  [facility]              [1]   Current Outpatient Medications   Medication Sig Dispense Refill    ciprofloxacin HCl (CIPRO) 500 MG tablet 1 pill one hour before procedure 1 tablet 0    diclofenac (VOLTAREN) 50 MG EC tablet Take 1 tablet (50 mg total) by mouth 2 (two) times daily. 60 tablet 2    levothyroxine (SYNTHROID, LEVOTHROID) 175 MCG tablet Take 1 tablet (175 mcg total) by mouth every morning. 90 tablet 3    tamsulosin (FLOMAX) 0.4 mg Cap Take 2 capsules (0.8 mg total) by mouth once daily. 180 capsule 3     No current facility-administered medications for this visit.

## 2025-02-20 NOTE — ASSESSMENT & PLAN NOTE
- Start ADT and abiraterone+pred for 24 months  - Plan for radiation therapy  - Labs q2 weeks for first 2 months  - RTC 4 weeks  - Ca+ Vit D  - DEXA  - Tempus

## 2025-02-21 ENCOUNTER — OFFICE VISIT (OUTPATIENT)
Dept: INTERNAL MEDICINE | Facility: CLINIC | Age: 70
End: 2025-02-21
Payer: MEDICARE

## 2025-02-21 VITALS
HEIGHT: 71 IN | WEIGHT: 171.5 LBS | HEART RATE: 93 BPM | OXYGEN SATURATION: 96 % | SYSTOLIC BLOOD PRESSURE: 130 MMHG | DIASTOLIC BLOOD PRESSURE: 54 MMHG | BODY MASS INDEX: 24.01 KG/M2

## 2025-02-21 DIAGNOSIS — E03.9 HYPOTHYROIDISM, UNSPECIFIED TYPE: ICD-10-CM

## 2025-02-21 DIAGNOSIS — Z12.11 COLON CANCER SCREENING: ICD-10-CM

## 2025-02-21 DIAGNOSIS — Z72.0 TOBACCO ABUSE: ICD-10-CM

## 2025-02-21 DIAGNOSIS — C61 PRIMARY ADENOCARCINOMA OF PROSTATE: ICD-10-CM

## 2025-02-21 DIAGNOSIS — F14.10 COCAINE ABUSE: Primary | ICD-10-CM

## 2025-02-21 DIAGNOSIS — I70.90 ATHEROSCLEROSIS: ICD-10-CM

## 2025-02-21 RX ORDER — LEVOTHYROXINE SODIUM 175 UG/1
175 TABLET ORAL EVERY MORNING
Qty: 90 TABLET | Refills: 3 | Status: SHIPPED | OUTPATIENT
Start: 2025-02-21

## 2025-02-21 NOTE — PROGRESS NOTES
"  This note was generated with CHOBOL[employer] voice recognition software. I apologize for any possible typographical errors.  MMkale seems to have trouble with pronouns so I apologize if I Mis pronoun anyone      He is a 68 yo gentleman coming in for follow up.  He has been dx'd with prostate cancer with spread to the lymph nodes.  . He will be getting radiation and started on   Abriaterone acetate and prednisone-- he is also going to be on Lupron.  He is going to see surgery.       His PSA is 35.1 --       Sister was here with him last visit and she   mentioned he was doing with crack cocaine.  He is working on this .  We discussed programs /phycology.  He says he is being more responsible.   We have long discussion about getting health.  Discussed support- sister ( lives out of town, two ex-wives support him. )  He has been in programs for years.  Smoking only - nothing in veins.           ROS : Gen - no fatigue -- he has gained 5 # since last visit.  Eyes - no eye pain or visual changes  ENT - no hoarseness or sore throat-- + nasal congestion   CV - No chest pain or SOB.  NO palpitations.  Pulm - no cough or wheezing  GI - no N/V/D   no dysuria or incontinence  MS -as above   Skin - no rash, or c/o of skin lesions  Neuro - no HA, dizziness--- memory is doing well.   Heme - no abnormal bleeding or bruising  Endo - no polydipsia, or temperature changes  Psych - no anxiety or depression              BP (!) 130/54   Pulse 93   Ht 5' 11" (1.803 m)   Wt 77.8 kg (171 lb 8.3 oz)   SpO2 96%   BMI 23.92 kg/m²     Elderly AA here today by himself.  Clean clothes- well kept.    Head: Normocephalic, without obvious abnormality, atraumatic  Throat: lips, mucosa, and tongue normal; teeth and gums normal  Neck: no adenopathy, no carotid bruit, no JVD, supple, symmetrical, trachea midline, and thyroid not enlarged, symmetric, no tenderness/mass/nodules  Back: symmetric, no curvature. ROM normal. No CVA tenderness.  Lungs: clear " to auscultation bilaterally  Chest wall: no tenderness  Heart: regular rate and rhythm, S1, S2 normal, no murmur, click, rub or gallop  Abdomen: soft, non-tender; bowel sounds normal; no masses,  no organomegaly  Extremities: extremities normal, atraumatic, no cyanosis or edema         Assessment and plan:     Prostate cancer     Hypothyroidism, unspecified type-- on synthroid- tsh in July- 7/2024.       Tobacco abuse-- not interested today-- would like to spend energy getting cocaine addiction addressed and prostate cancer addressed.       Cocaine abuse  - - not interested in seeing addiction psych.      not interested in vaccines . Today         Our office providers are the focal point for all needed health care services that are part of ongoing care related to a patient's single serious condition or the patient's complex conditions.       Follow up in 6 months

## 2025-02-24 ENCOUNTER — OFFICE VISIT (OUTPATIENT)
Dept: UROLOGY | Facility: CLINIC | Age: 70
End: 2025-02-24
Payer: MEDICARE

## 2025-02-24 ENCOUNTER — OFFICE VISIT (OUTPATIENT)
Dept: RADIATION ONCOLOGY | Facility: CLINIC | Age: 70
End: 2025-02-24
Payer: MEDICARE

## 2025-02-24 VITALS
SYSTOLIC BLOOD PRESSURE: 127 MMHG | RESPIRATION RATE: 16 BRPM | WEIGHT: 176.56 LBS | HEART RATE: 65 BPM | DIASTOLIC BLOOD PRESSURE: 86 MMHG | BODY MASS INDEX: 24.63 KG/M2

## 2025-02-24 VITALS
WEIGHT: 176.56 LBS | DIASTOLIC BLOOD PRESSURE: 86 MMHG | HEIGHT: 71 IN | BODY MASS INDEX: 24.72 KG/M2 | SYSTOLIC BLOOD PRESSURE: 127 MMHG | HEART RATE: 65 BPM

## 2025-02-24 DIAGNOSIS — C61 PROSTATE CANCER: Primary | ICD-10-CM

## 2025-02-24 PROCEDURE — 1159F MED LIST DOCD IN RCRD: CPT | Mod: CPTII,S$GLB,, | Performed by: STUDENT IN AN ORGANIZED HEALTH CARE EDUCATION/TRAINING PROGRAM

## 2025-02-24 PROCEDURE — 99215 OFFICE O/P EST HI 40 MIN: CPT | Mod: S$GLB,,, | Performed by: UROLOGY

## 2025-02-24 PROCEDURE — 1160F RVW MEDS BY RX/DR IN RCRD: CPT | Mod: CPTII,S$GLB,, | Performed by: STUDENT IN AN ORGANIZED HEALTH CARE EDUCATION/TRAINING PROGRAM

## 2025-02-24 PROCEDURE — 1125F AMNT PAIN NOTED PAIN PRSNT: CPT | Mod: CPTII,S$GLB,, | Performed by: UROLOGY

## 2025-02-24 PROCEDURE — 3074F SYST BP LT 130 MM HG: CPT | Mod: CPTII,S$GLB,, | Performed by: UROLOGY

## 2025-02-24 PROCEDURE — 3008F BODY MASS INDEX DOCD: CPT | Mod: CPTII,S$GLB,, | Performed by: UROLOGY

## 2025-02-24 PROCEDURE — 3079F DIAST BP 80-89 MM HG: CPT | Mod: CPTII,S$GLB,, | Performed by: STUDENT IN AN ORGANIZED HEALTH CARE EDUCATION/TRAINING PROGRAM

## 2025-02-24 PROCEDURE — 3074F SYST BP LT 130 MM HG: CPT | Mod: CPTII,S$GLB,, | Performed by: STUDENT IN AN ORGANIZED HEALTH CARE EDUCATION/TRAINING PROGRAM

## 2025-02-24 PROCEDURE — 99205 OFFICE O/P NEW HI 60 MIN: CPT | Mod: S$GLB,,, | Performed by: STUDENT IN AN ORGANIZED HEALTH CARE EDUCATION/TRAINING PROGRAM

## 2025-02-24 PROCEDURE — 99999 PR PBB SHADOW E&M-EST. PATIENT-LVL III: CPT | Mod: PBBFAC,,, | Performed by: STUDENT IN AN ORGANIZED HEALTH CARE EDUCATION/TRAINING PROGRAM

## 2025-02-24 PROCEDURE — 99999 PR PBB SHADOW E&M-EST. PATIENT-LVL II: CPT | Mod: PBBFAC,,, | Performed by: UROLOGY

## 2025-02-24 PROCEDURE — 3079F DIAST BP 80-89 MM HG: CPT | Mod: CPTII,S$GLB,, | Performed by: UROLOGY

## 2025-02-24 PROCEDURE — 3008F BODY MASS INDEX DOCD: CPT | Mod: CPTII,S$GLB,, | Performed by: STUDENT IN AN ORGANIZED HEALTH CARE EDUCATION/TRAINING PROGRAM

## 2025-02-24 NOTE — PROGRESS NOTES
[facility] - Radiation Oncology Consult Note         Date of Service: 02/24/2025    Chief Complaint: prostate cancer     Reason for visit: consideration for radiation to the pelvis     Referring Physician: Dr Laguerre (urology)     Implantable devices:  denies     Therapy to Date:  No radiation    Diagnosis/Assessment:   Gavin Balderrama is a 69 y.o. man with regional risk prostate adenocarcinoma Stage JANIE (cT1c, cN1, cM0, PSA: 35.1, Grade Group: 3) , s/p MRI with 30 cc gland, PIRADS 2, s/p TRUS prostate biopsy Dr. Laguerre 01/27/2025 with 9/12 standard cores involved; PSMA showed few bilateral subcentimeter iliac chain lymph nodes with mild uptake.    PMH of emphysema, every day tobacco use    ECOG 0  Patient stays up very late and falls asleep in the middle of the night    Plan   We discussed treatment options per NCCN guidelines v2025:  - EBRT + long-term ADT   - RP +/- PLND     EBRT would consist of daily radiation treatments M-F, 70Gy in 28 fractions to the prostate gland + regional pelvic lymph nodes.      Risks, benefits, and side effects of radiotherapy were discussed.   Side effects include but are not limited to fatigue, erythema, hyperpigmentation, desquamation within the radiation field, more frequent urination, both during the day and at night, urgency with urination, hematuria, dysuria, and a sensation of incomplete emptying.   In addition, the patient will be at risk for increased frequency and/or loose bowel movements.   All of these side effects will go away in the short term.   In the long term, the patient is at risk for radiation cystitis, radiation proctitis, and erectile dysfunction.     - no radiation therapy consent signed  - 03/07/2025 Lupron injection with Dr. Sheridan, with plan to start Zytiga prednisone  - never had a colonoscopy I will reach out to PCP (Stephan Mcgovern) so we can order one  - Epic- 26 survey filled  - repeat MRI prostate in 5 months so we can confirm the  patient is good candidate for rectal gel with Dr. Laguerre  - recommended beginning MiraLax every day  - return to radiation oncology in 5 months after MRI prostate    HPI:   Gavin Balderrama is a 69 y.o. man with recent diagnosis of prostate cancer after presenting with elevated PSA.    Oncologic history:  09/30/2024 MRI prostate, limited by artifact from rectal gas   30 cc gland   PI-RADS 2   Signs of prostatitis   No lymphadenopathy    11/25/2024 PSA 29.3    01/27/2025 TRUS prostate biopsy Dr. Laguerre   Prostate volume 32.1 cc, no median lobe  9/12 standard cores involved grade group 3 [place] score 4+3 equal to 7    02/11/2025 PET PSMA   few bilateral subcentimeter iliac chain lymph nodes with mild uptake   Three radiotracer avid foci in the prostate SUV max 7.5       02/20/2025 medical Oncology Dr. Sheridan   Discussed Lupron Zytiga and prednisone  02/20/2025 PSA 35.1    Subjective:   In clinic the patient is accompanied by significant other Juany    The patient reports feeling well and denies major issues.    The patient denies major urinary function issues such as dysuria or hematuria.  AUA score 21( [id number]) mostly dissatisfied  He can not take Advil/ibuprofen because he used to much in the past.  He takes Flomax 0.8 mg q.a.m.    The patient denies major bowel function issues such as diarrhea or rectal bleeding.  He reports constipation has a bowel movement 1-2 times per week.  Not taking stool softeners    The patient reports some sexual function issues.  KENNETH score 13 ([id number])     The patient denies other major complaints.     The patient denies any history of radiation therapy, implantable cardiac devices, or connective tissue disease.    Social history  He lives in [place] with a significant other Juany.  He is retired and used to work in [employer].  And worked at [employer].    Social History[1]    Family History   Problem Relation Name Age of Onset    Prostate cancer Father      Ovarian cancer Neg Hx       Pancreatic cancer Neg Hx         No past surgical history on file.    Past Medical History:   Diagnosis Date    BPH (benign prostatic hyperplasia)     Disorder of thyroid, unspecified      Review of patient's allergies indicates:   Allergen Reactions    Grass pollen-june grass standard        Medications Ordered Prior to Encounter[2]      Review of Systems  Negative unless as above    Objective:   Physical Exam  Vitals reviewed    Constitutional:       Appearance: Normal appearance.   HENT:      Head: Normocephalic and atraumatic.   Eyes:      Conjunctiva/sclera: Conjunctivae normal.   Pulmonary:      Effort: Pulmonary effort is normal.   Abdominal:      General: There is no distension.   Genitourinary:     Comments: ANGIE deferred  Musculoskeletal:         General: Normal range of motion.  Neurological:      General: No focal deficit present.      Mental Status: Alert and oriented  Psychiatric:         Mood and Affect: Mood normal.         Behavior: Behavior normal.      Imaging: I have personally reviewed the patient's available images and reports and summarized pertinent findings above in HPI.      Pathology: I have personally reviewed the patient's available pathology and summarized pertinent findings above in HPI.      Laboratory: I have personally reviewed the patient's available laboratory values and summarized pertinent findings above in HPI.      I spent approximately 60 minutes reviewing the available records and evaluating the patient, out of which over 50% of the time was spent face to face with the patient in counseling and coordinating this patient's care.     Thank you for the opportunity to care for this patient. Please do not hesitate to contact me with any questions.     Morgan Núñez MD/PhD         [1]   Social History  Tobacco Use    Smoking status: Every Day     Types: Cigarettes    Smokeless tobacco: Current   Substance Use Topics    Alcohol use: Not Currently    Drug use: Never   [2]    Current Outpatient Medications on File Prior to Visit   Medication Sig Dispense Refill    abiraterone (ZYTIGA) 250 mg Tab Take 1 tablet (250 mg total) by mouth once daily Take with low fat meal. (Patient not taking: Reported on 2/24/2025.) 30 tablet 11    calcium citrate-vitamin D3 500 mg-12.5 mcg (500 unit) Chew Take 1 tablet by mouth 2 (two) times a day. (Patient not taking: Reported on 2/24/2025) 60 tablet 5    levothyroxine (SYNTHROID, LEVOTHROID) 175 MCG tablet Take 1 tablet (175 mcg total) by mouth every morning. 90 tablet 3    predniSONE (DELTASONE) 5 MG tablet Take 1 tablet (5 mg total) by mouth once daily. (Patient not taking: Reported on 2/24/2025) 90 tablet 3    tamsulosin (FLOMAX) 0.4 mg Cap Take 2 capsules (0.8 mg total) by mouth once daily. 180 capsule 3     No current facility-administered medications on file prior to visit.

## 2025-02-24 NOTE — PROGRESS NOTES
[facility]  Urologic Oncology      Date of Service: 02/24/2025    Urologic Oncology Problem List:  High risk prostate cancer status post transrectal ultrasound-guided biopsy of the prostate on 01/27/2025  Pathology revealed grade group 3 at the right base, grade group 2 at the left apex, left middle, left base, right apex, and right mid  PSA 29.3 at the time of biopsy  PSMA PET scan showing few subcentimeter iliac chain lymph nodes demonstrating mild tracer avidity    History of Present Illness:   History of Present Illness    CHIEF COMPLAINT:  Mr. Balderrama presents today for follow up of prostate cancer diagnosis    PROSTATE CANCER:  He has a history of prostate cancer with elevated PSA levels. PET scan revealed lymph node involvement, suggesting metastatic spread. He denies any post-biopsy symptoms including blood in urine, stool, or semen.    FAMILY HISTORY:  His father had prostate cancer, which was discovered late before his passing. His brother-in-law is currently undergoing radiation treatment for prostate cancer with reported good progress.    MEDICATIONS:  He has picked up one prescription, with a second prescription pending at the specialty pharmacy. Calcium and Vitamin D have been prescribed but not yet obtained.         Imaging: I have reviewed the imaging study PSMA PET scan on 02/11/2025 personally, have independently interpreted this study, and agree with the findings    Allergies:  Review of patient's allergies indicates:   Allergen Reactions    Grass pollen-eliseo grass standard         Medications per EMR:  Prescriptions Prior to Admission[1]    Past Medical History:  Past Medical History:   Diagnosis Date    BPH (benign prostatic hyperplasia)     Disorder of thyroid, unspecified         Past Surgical History:  No past surgical history on file.     Family History:  Family History   Problem Relation Name Age of Onset    Prostate cancer Father      Ovarian cancer Neg Hx      Pancreatic cancer  Neg Hx          Social History:  Social History     Tobacco Use    Smoking status: Every Day     Types: Cigarettes    Smokeless tobacco: Current   Substance Use Topics    Alcohol use: Not Currently          OBJECTIVE:     Vitals:    02/24/25 0955   BP: 127/86   Pulse: 65   Resp: 16   Weight: 80.1 kg (176 lb 9.4 oz)        Physical Exam    General: No acute distress. Nontoxic appearing.  HENT: Normocephalic. Atraumatic.  Respiratory: Normal respiratory effort. No conversational dyspnea. No audible wheezing.  Abdomen: No obvious distension.  Skin: No visible abnormalities.  Extremities: No edema upper extremities. No edema lower extremities.  Neurological: Alert and oriented x3. Normal speech.  Psychiatric: Normal mood. Normal affect. No evidence of SI.     LABS:    CBC:  Lab Results   Component Value Date    WBC 4.18 02/20/2025    HGB 16.6 02/20/2025    HCT 49.9 02/20/2025     (H) 02/20/2025     02/20/2025         BMP:  Lab Results   Component Value Date     02/20/2025    K 4.2 02/20/2025     02/20/2025    CO2 27 02/20/2025    BUN 20 02/20/2025    CREATININE 1.3 02/20/2025    CALCIUM 10.4 02/20/2025    ANIONGAP 8 02/20/2025    EGFRNORACEVR 59.1 (A) 02/20/2025         ASSESSMENT/PLAN:     Assessment & Plan      PROSTATE CANCER:  - Diagnosed high-risk prostate cancer with lymph node involvement based on pathology (Grade group 3) and elevated PSA.  - PET scan confirmed lymph node involvement.  - Surgery not recommended due to low likelihood of achieving cancer-free status and higher risk of side effects.  - Recommend treatment plan: combination of radiation therapy and hormone therapy.  - Radiation to target prostate and pelvic lymph nodes.  - Hormone therapy to treat prostate cancer cells and lymph node involvement.  - Noted potential for cancer recurrence even with good initial response to treatment.  - Explained Grade group 3 prostate cancer (scale of 1-5) as intermediate-higher risk.  -  Discussed implications of lymph node involvement on treatment approach and prognosis.  - Provided overview of treatment options (surgery vs. radiation + hormone therapy) and their respective benefits and risks.  - Explained potential side effects of treatments, including urinary and sexual dysfunction.  - Discussed comparative side effects of surgery vs. radiation therapy, with radiation generally having less severe impacts on erectile and urinary function.    VITAMIN D DEFICIENCY:  - Start vitamin D supplement.    CALCIUM DEFICIENCY:  - Start calcium supplement.    FOLLOW-UP:  - Referred to Dr. Martinez (radiation oncologist) for consultation      I spent a total of 40 minutes on the day of the visit.This includes face to face time and non-face to face time preparing to see the patient (eg, review of tests), obtaining and/or reviewing separately obtained history, documenting clinical information in the electronic or other health record, independently interpreting results and communicating results to the patient/family/caregiver, or care coordinator    This encounter was dictated and transcribed using DeepScribe and FluencyDirect, please excuse any typographical or grammatical errors.         [1] (Not in a hospital admission)

## 2025-03-06 ENCOUNTER — HOSPITAL ENCOUNTER (OUTPATIENT)
Dept: RADIOLOGY | Facility: CLINIC | Age: 70
Discharge: HOME | End: 2025-03-06
Attending: HOSPITALIST
Payer: MEDICARE

## 2025-03-06 DIAGNOSIS — Z79.818 ANDROGEN DEPRIVATION THERAPY: ICD-10-CM

## 2025-03-06 DIAGNOSIS — C61 PRIMARY ADENOCARCINOMA OF PROSTATE: ICD-10-CM

## 2025-03-06 PROCEDURE — 77080 DXA BONE DENSITY AXIAL: CPT | Mod: TC

## 2025-03-07 ENCOUNTER — LAB VISIT (OUTPATIENT)
Dept: LAB | Facility: HOSPITAL | Age: 70
End: 2025-03-07
Attending: HOSPITALIST
Payer: MEDICARE

## 2025-03-07 ENCOUNTER — OFFICE VISIT (OUTPATIENT)
Dept: HEMATOLOGY/ONCOLOGY | Facility: CLINIC | Age: 70
End: 2025-03-07
Payer: MEDICARE

## 2025-03-07 ENCOUNTER — INFUSION (OUTPATIENT)
Dept: INFUSION THERAPY | Facility: HOSPITAL | Age: 70
End: 2025-03-07
Payer: MEDICARE

## 2025-03-07 VITALS
DIASTOLIC BLOOD PRESSURE: 71 MMHG | RESPIRATION RATE: 18 BRPM | TEMPERATURE: 98 F | SYSTOLIC BLOOD PRESSURE: 110 MMHG | BODY MASS INDEX: 23.77 KG/M2 | WEIGHT: 169.75 LBS | HEIGHT: 71 IN | OXYGEN SATURATION: 97 % | HEART RATE: 78 BPM

## 2025-03-07 DIAGNOSIS — C61 PRIMARY ADENOCARCINOMA OF PROSTATE: Primary | ICD-10-CM

## 2025-03-07 DIAGNOSIS — Z79.899 LONG TERM CURRENT USE OF THERAPEUTIC DRUG: ICD-10-CM

## 2025-03-07 DIAGNOSIS — N13.8 BPH WITH URINARY OBSTRUCTION: ICD-10-CM

## 2025-03-07 DIAGNOSIS — Z79.818 ANDROGEN DEPRIVATION THERAPY: ICD-10-CM

## 2025-03-07 DIAGNOSIS — N40.1 BPH WITH URINARY OBSTRUCTION: ICD-10-CM

## 2025-03-07 DIAGNOSIS — C61 PRIMARY ADENOCARCINOMA OF PROSTATE: ICD-10-CM

## 2025-03-07 DIAGNOSIS — E89.0 POSTOPERATIVE HYPOTHYROIDISM: ICD-10-CM

## 2025-03-07 LAB
ALBUMIN SERPL BCP-MCNC: 3.6 G/DL (ref 3.5–5.2)
ALP SERPL-CCNC: 79 U/L (ref 40–150)
ALT SERPL W/O P-5'-P-CCNC: 10 U/L (ref 10–44)
ANION GAP SERPL CALC-SCNC: 6 MMOL/L (ref 8–16)
AST SERPL-CCNC: 19 U/L (ref 10–40)
BILIRUB SERPL-MCNC: 0.3 MG/DL (ref 0.1–1)
BUN SERPL-MCNC: 16 MG/DL (ref 8–23)
CALCIUM SERPL-MCNC: 9.2 MG/DL (ref 8.7–10.5)
CHLORIDE SERPL-SCNC: 104 MMOL/L (ref 95–110)
CO2 SERPL-SCNC: 30 MMOL/L (ref 23–29)
COMPLEXED PSA SERPL-MCNC: 31.6 NG/ML (ref 0–4)
CREAT SERPL-MCNC: 1.4 MG/DL (ref 0.5–1.4)
ERYTHROCYTE [DISTWIDTH] IN BLOOD BY AUTOMATED COUNT: 13.2 % (ref 11.5–14.5)
EST. GFR  (NO RACE VARIABLE): 54.1 ML/MIN/1.73 M^2
GLUCOSE SERPL-MCNC: 112 MG/DL (ref 70–110)
HCT VFR BLD AUTO: 43.3 % (ref 40–54)
HGB BLD-MCNC: 14.4 G/DL (ref 14–18)
IMM GRANULOCYTES # BLD AUTO: 0.01 K/UL (ref 0–0.04)
MCH RBC QN AUTO: 35.2 PG (ref 27–31)
MCHC RBC AUTO-ENTMCNC: 33.3 G/DL (ref 32–36)
MCV RBC AUTO: 106 FL (ref 82–98)
NEUTROPHILS # BLD AUTO: 1.5 K/UL (ref 1.8–7.7)
PLATELET # BLD AUTO: 280 K/UL (ref 150–450)
PMV BLD AUTO: 9.7 FL (ref 9.2–12.9)
POTASSIUM SERPL-SCNC: 4.2 MMOL/L (ref 3.5–5.1)
PROT SERPL-MCNC: 6.7 G/DL (ref 6–8.4)
RBC # BLD AUTO: 4.09 M/UL (ref 4.6–6.2)
SODIUM SERPL-SCNC: 140 MMOL/L (ref 136–145)
WBC # BLD AUTO: 3.84 K/UL (ref 3.9–12.7)

## 2025-03-07 PROCEDURE — 96402 CHEMO HORMON ANTINEOPL SQ/IM: CPT

## 2025-03-07 PROCEDURE — 85027 COMPLETE CBC AUTOMATED: CPT | Performed by: HOSPITALIST

## 2025-03-07 PROCEDURE — 63600175 PHARM REV CODE 636 W HCPCS: Mod: JZ,TB | Performed by: NURSE PRACTITIONER

## 2025-03-07 PROCEDURE — 80053 COMPREHEN METABOLIC PANEL: CPT | Performed by: HOSPITALIST

## 2025-03-07 PROCEDURE — 99999 PR PBB SHADOW E&M-EST. PATIENT-LVL IV: CPT | Mod: PBBFAC,,, | Performed by: NURSE PRACTITIONER

## 2025-03-07 PROCEDURE — 84153 ASSAY OF PSA TOTAL: CPT | Performed by: HOSPITALIST

## 2025-03-07 PROCEDURE — 36415 COLL VENOUS BLD VENIPUNCTURE: CPT | Performed by: HOSPITALIST

## 2025-03-07 RX ADMIN — LEUPROLIDE ACETATE 22.5 MG: KIT at 03:03

## 2025-03-07 NOTE — PATIENT INSTRUCTIONS
Lower volume but PSMA avid pelvic nodes. Will treat as regional node positive disease. Saw radiation oncology will elect for definitive RT treatment will start ADT + abiraterone-prednisone x24 months.      -- Plan to start ADT + AAP 3/8/25  -- first Lupron shot today  -- Safety labs q2 weeks x2 month after starting AAP  -- Start Ca/D; DEXA results pending  -- Will discuss sending germline genetics via [employer] xG at follow up

## 2025-03-07 NOTE — PROGRESS NOTES
[facility] - NEW PATIENT VISIT     Best Contact Phone Number(s): [phone] (home)       Cancer/Stage/TNM:    Cancer Staging   Primary adenocarcinoma of prostate  Staging form: Prostate, AJCC 8th Edition  - Clinical: Stage JANIE (cT1c, cN1, cM0, PSA: 35.1, Grade Group: 3) - Signed by Orion Sheridan MD on 2/20/2025        Reason for visit:   Chief Complaint   Patient presents with    Prostate Cancer        HPI:   Gavin Balderrama is a 69 y.o. male with regional node positive prostate cancer. Initially diagnosed with [place] 4+3 disease 01/2025 in setting of elevated PSA to 29.3 and chronic obstructive LUTS. PSMA PET imaging 02/2025 with low volume PSMA avid iliac nodes. He presents to medical oncology clinic for follow up.      Interval history:   Presents today alone to clinic for follow up of prostate cancer.  Plan to start both leuprolide and AAP today.  Has been feeling well with no complaints.     Patient reports frequent nocturia with moderate obstructive symptoms including urinary hesitancy. Reports nocturia up to 4x now. Has been on tamsulosin,  however had run out of prescription.  Will reassess symptoms next visit.      Plan:  Lower volume but PSMA avid pelvic nodes. Will treat as regional node positive disease. He has seen radiation oncology and has elected definitive RT treatment.  Will start ADT + abiraterone-prednisone x24 months.    History has been obtained by chart review and discussion with the patient.     Oncology History   Primary adenocarcinoma of prostate   9/30/2024 Imaging Significant Findings    09/30/24: Prostate MRI  Impression:  - Findings suggestive of prostatitis.  Benign prostatic hyperplasia.  No focal lesion suspicious for prostate malignancy.  - Overall Assessment: PI-RADS 2 - Low (clinically significant cancer is unlikely to be present).     1/27/2025 Biopsy    01/27/25: Prostate biopsy  Prostate adenocarcinoma. 9/12 template cores. Up to [place] 4+3    "  2/11/2025 Imaging Significant Findings    02/11/25 PSMA PET CT  Impression:  - Few tracer avid foci within the prostate.  Few bilateral subcentimeter iliac chain lymph nodes demonstrating mild tracer avidity, suspicious for metastatic foci.  No tracer avid lesions elsewhere.     2/20/2025 Initial Diagnosis    Primary adenocarcinoma of prostate     2/20/2025 Cancer Staged    Staging form: Prostate, AJCC 8th Edition  - Clinical: Stage JANIE (cT1c, cN1, cM0, PSA: 35.1, Grade Group: 3)           Past Medical History:   Diagnosis Date    BPH (benign prostatic hyperplasia)     Disorder of thyroid, unspecified          No past surgical history on file.      I have reviewed and updated the patient's past medical, surgical, family and social histories.     Review of patient's allergies indicates:   Allergen Reactions    Grass pollen-june grass standard          Current Medications[1]    Review of Systems   Constitutional:  Positive for weight loss (intentional). Negative for chills and fever.   Respiratory:  Positive for cough (seasonal). Negative for shortness of breath.    Cardiovascular:  Negative for chest pain, palpitations and leg swelling.   Gastrointestinal:  Negative for constipation and diarrhea.   Genitourinary:  Positive for frequency. Negative for dysuria, flank pain, hematuria and urgency.   Musculoskeletal:  Positive for joint pain (bilateral knee).   Skin:  Positive for rash (bilateral knees).   Neurological:  Negative for dizziness and weakness.          Objective:      Physical Exam:   /71 (BP Location: Right arm, Patient Position: Sitting)   Pulse 78   Temp 98 °F (36.7 °C) (Temporal)   Resp 18   Ht 5' 11" (1.803 m)   Wt 77 kg (169 lb 12.1 oz)   SpO2 97%   BMI 23.68 kg/m²       ECOG Performance status: (0) Fully active, able to carry on all predisease performance without restriction     Physical Exam  Vitals reviewed.   Constitutional:       Appearance: Normal appearance. He is normal weight. "   HENT:      Head: Normocephalic.      Nose: Nose normal.      Mouth/Throat:      Mouth: Mucous membranes are moist.   Cardiovascular:      Rate and Rhythm: Normal rate and regular rhythm.      Pulses: Normal pulses.      Heart sounds: Normal heart sounds.   Pulmonary:      Effort: Pulmonary effort is normal.      Breath sounds: Normal breath sounds.   Abdominal:      General: Bowel sounds are normal.      Palpations: Abdomen is soft.   Musculoskeletal:         General: Normal range of motion.      Cervical back: Normal range of motion.      Right lower leg: No edema.      Left lower leg: No edema.   Skin:     General: Skin is warm and dry.      Capillary Refill: Capillary refill takes less than 2 seconds.   Neurological:      Mental Status: He is alert and oriented to person, place, and time.          Recent Labs:   Lab Results   Component Value Date    WBC 3.84 (L) 03/07/2025    RBC 4.09 (L) 03/07/2025    HGB 14.4 03/07/2025    HCT 43.3 03/07/2025     (H) 03/07/2025    MCH 35.2 (H) 03/07/2025    MCHC 33.3 03/07/2025    RDW 13.2 03/07/2025     03/07/2025    MPV 9.7 03/07/2025    IMMGR 0.4 07/10/2024    GRAN 1.5 (L) 03/07/2025    IGABS 0.01 03/07/2025    LYMPH 1.2 07/10/2024    LYMPH 48.0 07/10/2024    MONO 0.4 07/10/2024    MONO 15.2 (H) 07/10/2024    EOS 0.1 07/10/2024    BASO 0.03 07/10/2024    NRBC 0 07/10/2024    EOSINOPHIL 3.7 07/10/2024    BASOPHIL 1.2 07/10/2024    DIFFMETHOD Automated 07/10/2024       Lab Results   Component Value Date     03/07/2025    K 4.2 03/07/2025     03/07/2025    CO2 30 (H) 03/07/2025     (H) 03/07/2025    BUN 16 03/07/2025    CREATININE 1.4 03/07/2025    CALCIUM 9.2 03/07/2025    PROT 6.7 03/07/2025    ALBUMIN 3.6 03/07/2025    BILITOT 0.3 03/07/2025    ALKPHOS 79 03/07/2025    AST 19 03/07/2025    ALT 10 03/07/2025    ANIONGAP 6 (L) 03/07/2025    EGFRNORACEVR 54.1 (A) 03/07/2025        Lab Results   Component Value Date    PSADIAG 31.6 (H)  03/07/2025    PSADIAG 35.1 (H) 02/20/2025    PSADIAG 29.3 (H) 11/25/2024    PSADIAG 24.4 (H) 08/26/2024    PSADIAG 17.6 (H) 07/10/2024        Cardiovascular Screening:  Primary care physician: Nick Hui Jr., MD      The 10-year ASCVD risk score (Madhavi MCDONALD, et al., 2019) is: 13.2%    Values used to calculate the score:      Age: 69 years      Sex: Male      Is Non- : Yes      Diabetic: No      Tobacco smoker: Yes      Systolic Blood Pressure: 110 mmHg      Is BP treated: No      HDL Cholesterol: 63 mg/dL      Total Cholesterol: 152 mg/dL    ASCVD Risk Level: Intermediate 7.5% - 19.9%    EKG:   Results for orders placed or performed during the hospital encounter of 07/15/24   SCHEDULED EKG 12-LEAD (to Muse)    Collection Time: 07/15/24 12:22 PM   Result Value Ref Range    QRS Duration 72 ms    OHS QTC Calculation 420 ms    Narrative    Test Reason : R00.1,    Vent. Rate : 064 BPM     Atrial Rate : 064 BPM     P-R Int : 182 ms          QRS Dur : 072 ms      QT Int : 408 ms       P-R-T Axes : 078 063 079 degrees     QTc Int : 420 ms    Normal sinus rhythm  Low septal forces  Otherwise normal ECG  No previous ECGs available  Confirmed by Yobany Dexter MD ([id number]) on 7/15/2024 12:34:27 PM    Referred By: NICK HUI           Confirmed By:Yobany Dexter MD       High blood pressure = No  Antihypertensive agents: This patient does not have an active medication from one of the medication groupers.   Comments:     DM2: No  Antidiabetic agents: This patient does not have an active medication from one of the medication groupers.   Comments:     Hyperlipidemia: No  Lipid lowering agents: [unfilled]   Comments:     Antiplatelet therapy: No  Agent: This patient does not have an active medication from one of the medication groupers.   Comment:     Body mass index is 23.68 kg/m².  If greater than 30, referral to nutrition    Lab Results   Component Value Date    CHOL 152 07/10/2024    LDLCALC  "80.2 07/10/2024    HDL 63 07/10/2024    TRIG 44 07/10/2024    HGBA1C 4.9 07/10/2024        Bone Health    No results found for: "GFTLBVSQ376X", "LPLGIYLV60JC"     No results found for this or any previous visit.       Vitamin D: 1000 IU daily  Calcium: Recommend 4 servings of dairy daily     Osteopenia/Osteoporosis: Unknown    Bone strengthening agent: None     Staging Imaging     Results for orders placed during the hospital encounter of 02/11/25    NM PET CT F 18 PYL PSMA, [place]idth[place] to Vertex    Impression  Few tracer avid foci within the prostate.  Few bilateral subcentimeter iliac chain lymph nodes demonstrating mild tracer avidity, suspicious for metastatic foci.  No tracer avid lesions elsewhere.    Electronically signed by resident: Melina Castillo  Date:    02/11/2025  Time:    16:37    Electronically signed by: Ren España  Date:    02/11/2025  Time:    17:27       No results found for this or any previous visit.      Results for orders placed during the hospital encounter of 09/30/24    MRI Prostate W W/O Contrast    Impression  Findings suggestive of prostatitis.  Benign prostatic hyperplasia.  No focal lesion suspicious for prostate malignancy.    Overall Assessment: PI-RADS 2 - Low (clinically significant cancer is unlikely to be present).    Number of targets created for potential MR/US fusion biopsy:    Peripheral zone: 0    Transition zone: 0    Electronically signed by resident: Josse Manjarrez  Date:    10/01/2024  Time:    09:16    Electronically signed by: Ted Reese MD  Date:    10/01/2024  Time:    10:10       No results found for this or any previous visit.       I have personally reviewed the above imaging.     Path:   Reviewed pathology as documented above.    Genomic testing:     Germline genetic testing  No results found for this or any previous visit.     Somatic tumor genotyping:      ctDNA genotyping:       Diagnoses:     1. Primary adenocarcinoma of prostate    2. Androgen " deprivation therapy    3. Long term current use of therapeutic drug    4. Postoperative hypothyroidism    5. BPH with urinary obstruction          Assessment and Plan:     Lower volume but PSMA avid pelvic nodes. Will treat as regional node positive disease. Saw radiation oncology will elect for definitive RT treatment will start ADT + abiraterone-prednisone x24 months.      -- Plan to start ADT + AAP 3/8/25  -- first Lupron shot today, continue every 3 months next due 6/7/25  -- Safety labs q2 weeks x2 month after starting AAP  -- Start Ca/D; DEXA results pending  -- Will discuss sending germline genetics via [facility] at follow up    2. Hypothyroidism  -- continue levothyroxine    3. Bph with obstruction  -- continue tamsulosin 0.8 mg po nightly      Follow up:   Route Chart for Scheduling    Med Onc Chart Routing      Follow up with physician 4 weeks. rtc with labs (cbc, cmp, psa) and to see Dr. Sheridan   Follow up with [facility]    Infusion scheduling note    Injection scheduling note leuprolide every 3mths next 6/7/25   Labs CBC, CMP and PSA   Scheduling:  Preferred lab:  Lab interval: every 2 weeks  cbc, cmp, psa   Imaging    Pharmacy appointment    Other referrals                     Supportive Plan Information  OP PROSTATE LEUPROLIDE Q3MO Orion Sheridan MD   Associated Diagnosis: Primary adenocarcinoma of prostate Stage JANIE cT1c, cN1, cM0, PSA: 35.1, Grade Group: 3 noted on 2/20/2025   Line of treatment: First Line   Treatment goal: Curative     Upcoming Treatment Dates - OP PROSTATE LEUPROLIDE Q3MO    5/29/2025       Chemotherapy       leuprolide (LUPRON) injection 22.5 mg  8/21/2025       Chemotherapy       leuprolide (LUPRON) injection 22.5 mg  11/13/2025       Chemotherapy       leuprolide (LUPRON) injection 22.5 mg  2/5/2026       Chemotherapy       leuprolide (LUPRON) injection 22.5 mg         The above information has been reviewed with the patient and all questions have been answered to their apparent  satisfaction.  They understand that they can call the clinic with any questions.    Melissa T Voltz Melissa Voltz, APRN [facility]  [facility] [place] [address]  Phone: (o) [phone] [id number] code applied: patient requires or will require a continuous, longitudinal, and active collaborative plan of care related to this patient's health condition, prostate cancer --the management of which requires the direction of a practitioner with specialized clinical knowledge, skill, and expertise.            [1]   Current Outpatient Medications   Medication Sig Dispense Refill    abiraterone (ZYTIGA) 250 mg Tab Take 1 tablet (250 mg total) by mouth once daily Take with low fat meal. (Patient not taking: Reported on 2/24/2025.) 30 tablet 11    calcium citrate-vitamin D3 500 mg-12.5 mcg (500 unit) Chew Take 1 tablet by mouth 2 (two) times a day. (Patient not taking: Reported on 2/24/2025) 60 tablet 5    levothyroxine (SYNTHROID, LEVOTHROID) 175 MCG tablet Take 1 tablet (175 mcg total) by mouth every morning. 90 tablet 3    predniSONE (DELTASONE) 5 MG tablet Take 1 tablet (5 mg total) by mouth once daily. (Patient not taking: Reported on 2/24/2025) 90 tablet 3    tamsulosin (FLOMAX) 0.4 mg Cap Take 2 capsules (0.8 mg total) by mouth once daily. 180 capsule 3     No current facility-administered medications for this visit.

## 2025-03-14 ENCOUNTER — LAB VISIT (OUTPATIENT)
Dept: LAB | Facility: OTHER | Age: 70
End: 2025-03-14
Attending: NURSE PRACTITIONER
Payer: MEDICARE

## 2025-03-14 DIAGNOSIS — C61 PRIMARY ADENOCARCINOMA OF PROSTATE: ICD-10-CM

## 2025-03-14 LAB
ALBUMIN SERPL BCP-MCNC: 4.4 G/DL (ref 3.5–5.2)
ALP SERPL-CCNC: 74 U/L (ref 40–150)
ALT SERPL W/O P-5'-P-CCNC: 10 U/L (ref 10–44)
ANION GAP SERPL CALC-SCNC: 12 MMOL/L (ref 8–16)
AST SERPL-CCNC: 16 U/L (ref 10–40)
BASOPHILS # BLD AUTO: 0.05 K/UL (ref 0–0.2)
BASOPHILS NFR BLD: 1 % (ref 0–1.9)
BILIRUB SERPL-MCNC: 0.6 MG/DL (ref 0.1–1)
BUN SERPL-MCNC: 19 MG/DL (ref 8–23)
CALCIUM SERPL-MCNC: 9.8 MG/DL (ref 8.7–10.5)
CHLORIDE SERPL-SCNC: 103 MMOL/L (ref 95–110)
CO2 SERPL-SCNC: 24 MMOL/L (ref 23–29)
COMPLEXED PSA SERPL-MCNC: 29.7 NG/ML (ref 0–4)
CREAT SERPL-MCNC: 1.3 MG/DL (ref 0.5–1.4)
DIFFERENTIAL METHOD BLD: ABNORMAL
EOSINOPHIL # BLD AUTO: 0.1 K/UL (ref 0–0.5)
EOSINOPHIL NFR BLD: 2.6 % (ref 0–8)
ERYTHROCYTE [DISTWIDTH] IN BLOOD BY AUTOMATED COUNT: 13.2 % (ref 11.5–14.5)
EST. GFR  (NO RACE VARIABLE): 59 ML/MIN/1.73 M^2
GLUCOSE SERPL-MCNC: 92 MG/DL (ref 70–110)
HCT VFR BLD AUTO: 42.4 % (ref 40–54)
HGB BLD-MCNC: 14.5 G/DL (ref 14–18)
IMM GRANULOCYTES # BLD AUTO: 0.02 K/UL (ref 0–0.04)
IMM GRANULOCYTES NFR BLD AUTO: 0.4 % (ref 0–0.5)
LYMPHOCYTES # BLD AUTO: 1.4 K/UL (ref 1–4.8)
LYMPHOCYTES NFR BLD: 27.3 % (ref 18–48)
MCH RBC QN AUTO: 35.5 PG (ref 27–31)
MCHC RBC AUTO-ENTMCNC: 34.2 G/DL (ref 32–36)
MCV RBC AUTO: 104 FL (ref 82–98)
MONOCYTES # BLD AUTO: 0.8 K/UL (ref 0.3–1)
MONOCYTES NFR BLD: 15 % (ref 4–15)
NEUTROPHILS # BLD AUTO: 2.7 K/UL (ref 1.8–7.7)
NEUTROPHILS NFR BLD: 53.7 % (ref 38–73)
NRBC BLD-RTO: 0 /100 WBC
PLATELET # BLD AUTO: 333 K/UL (ref 150–450)
PMV BLD AUTO: 10 FL (ref 9.2–12.9)
POTASSIUM SERPL-SCNC: 4 MMOL/L (ref 3.5–5.1)
PROT SERPL-MCNC: 7.8 G/DL (ref 6–8.4)
RBC # BLD AUTO: 4.08 M/UL (ref 4.6–6.2)
SODIUM SERPL-SCNC: 139 MMOL/L (ref 136–145)
WBC # BLD AUTO: 5.05 K/UL (ref 3.9–12.7)

## 2025-03-14 PROCEDURE — 80053 COMPREHEN METABOLIC PANEL: CPT | Performed by: NURSE PRACTITIONER

## 2025-03-14 PROCEDURE — 85025 COMPLETE CBC W/AUTO DIFF WBC: CPT | Performed by: NURSE PRACTITIONER

## 2025-03-14 PROCEDURE — 36415 COLL VENOUS BLD VENIPUNCTURE: CPT | Performed by: NURSE PRACTITIONER

## 2025-03-14 PROCEDURE — 84153 ASSAY OF PSA TOTAL: CPT | Performed by: NURSE PRACTITIONER

## 2025-03-17 ENCOUNTER — RESULTS FOLLOW-UP (OUTPATIENT)
Dept: HEMATOLOGY/ONCOLOGY | Facility: CLINIC | Age: 70
End: 2025-03-17

## 2025-03-28 ENCOUNTER — LAB VISIT (OUTPATIENT)
Dept: LAB | Facility: OTHER | Age: 70
End: 2025-03-28
Attending: NURSE PRACTITIONER
Payer: MEDICARE

## 2025-03-28 DIAGNOSIS — C61 PRIMARY ADENOCARCINOMA OF PROSTATE: ICD-10-CM

## 2025-03-28 LAB
ABSOLUTE EOSINOPHIL (OHS): 0.03 K/UL
ABSOLUTE MONOCYTE (OHS): 0.37 K/UL (ref 0.3–1)
ABSOLUTE NEUTROPHIL COUNT (OHS): 4.25 K/UL (ref 1.8–7.7)
ALBUMIN SERPL BCP-MCNC: 4.6 G/DL (ref 3.5–5.2)
ALP SERPL-CCNC: 83 UNIT/L (ref 40–150)
ALT SERPL W/O P-5'-P-CCNC: 14 UNIT/L (ref 10–44)
ANION GAP (OHS): 12 MMOL/L (ref 8–16)
AST SERPL-CCNC: 20 UNIT/L (ref 11–45)
BASOPHILS # BLD AUTO: 0.02 K/UL
BASOPHILS NFR BLD AUTO: 0.3 %
BILIRUB SERPL-MCNC: 0.5 MG/DL (ref 0.1–1)
BUN SERPL-MCNC: 19 MG/DL (ref 8–23)
CALCIUM SERPL-MCNC: 10.3 MG/DL (ref 8.7–10.5)
CHLORIDE SERPL-SCNC: 103 MMOL/L (ref 95–110)
CO2 SERPL-SCNC: 26 MMOL/L (ref 23–29)
CREAT SERPL-MCNC: 1.3 MG/DL (ref 0.5–1.4)
ERYTHROCYTE [DISTWIDTH] IN BLOOD BY AUTOMATED COUNT: 13.3 % (ref 11.5–14.5)
GFR SERPLBLD CREATININE-BSD FMLA CKD-EPI: 59 ML/MIN/1.73/M2
GLUCOSE SERPL-MCNC: 99 MG/DL (ref 70–110)
HCT VFR BLD AUTO: 44.1 % (ref 40–54)
HGB BLD-MCNC: 14.7 GM/DL (ref 14–18)
IMM GRANULOCYTES # BLD AUTO: 0.05 K/UL (ref 0–0.04)
IMM GRANULOCYTES NFR BLD AUTO: 0.8 % (ref 0–0.5)
LYMPHOCYTES # BLD AUTO: 1.18 K/UL (ref 1–4.8)
MCH RBC QN AUTO: 35.1 PG (ref 27–50)
MCHC RBC AUTO-ENTMCNC: 33.3 G/DL (ref 32–36)
MCV RBC AUTO: 105 FL (ref 82–98)
NUCLEATED RBC (/100WBC) (OHS): 0 /100 WBC
PLATELET # BLD AUTO: 321 K/UL (ref 150–450)
PMV BLD AUTO: 9.6 FL (ref 9.2–12.9)
POTASSIUM SERPL-SCNC: 4.9 MMOL/L (ref 3.5–5.1)
PROT SERPL-MCNC: 8.4 GM/DL (ref 6–8.4)
PSA SERPL-MCNC: 8.96 NG/ML
RBC # BLD AUTO: 4.19 M/UL (ref 4.6–6.2)
RELATIVE EOSINOPHIL (OHS): 0.5 %
RELATIVE LYMPHOCYTE (OHS): 20 % (ref 18–48)
RELATIVE MONOCYTE (OHS): 6.3 % (ref 4–15)
RELATIVE NEUTROPHIL (OHS): 72.1 % (ref 38–73)
SODIUM SERPL-SCNC: 141 MMOL/L (ref 136–145)
WBC # BLD AUTO: 5.9 K/UL (ref 3.9–12.7)

## 2025-03-28 PROCEDURE — 82040 ASSAY OF SERUM ALBUMIN: CPT

## 2025-03-28 PROCEDURE — 36415 COLL VENOUS BLD VENIPUNCTURE: CPT

## 2025-03-28 PROCEDURE — 85025 COMPLETE CBC W/AUTO DIFF WBC: CPT

## 2025-03-28 PROCEDURE — 84153 ASSAY OF PSA TOTAL: CPT

## 2025-04-11 ENCOUNTER — LAB VISIT (OUTPATIENT)
Dept: LAB | Facility: OTHER | Age: 70
End: 2025-04-11
Attending: HOSPITALIST
Payer: MEDICARE

## 2025-04-11 DIAGNOSIS — C61 PRIMARY ADENOCARCINOMA OF PROSTATE: ICD-10-CM

## 2025-04-11 LAB
ABSOLUTE NEUTROPHIL COUNT (OHS): 2.07 K/UL (ref 1.8–7.7)
ALBUMIN SERPL BCP-MCNC: 4.4 G/DL (ref 3.5–5.2)
ALP SERPL-CCNC: 85 UNIT/L (ref 40–150)
ALT SERPL W/O P-5'-P-CCNC: 18 UNIT/L (ref 10–44)
ANION GAP (OHS): 11 MMOL/L (ref 8–16)
AST SERPL-CCNC: 23 UNIT/L (ref 11–45)
BILIRUB SERPL-MCNC: 0.4 MG/DL (ref 0.1–1)
BUN SERPL-MCNC: 19 MG/DL (ref 8–23)
CALCIUM SERPL-MCNC: 10.6 MG/DL (ref 8.7–10.5)
CHLORIDE SERPL-SCNC: 103 MMOL/L (ref 95–110)
CO2 SERPL-SCNC: 26 MMOL/L (ref 23–29)
CREAT SERPL-MCNC: 1.4 MG/DL (ref 0.5–1.4)
ERYTHROCYTE [DISTWIDTH] IN BLOOD BY AUTOMATED COUNT: 13.2 % (ref 11.5–14.5)
GFR SERPLBLD CREATININE-BSD FMLA CKD-EPI: 54 ML/MIN/1.73/M2
GLUCOSE SERPL-MCNC: 166 MG/DL (ref 70–110)
HCT VFR BLD AUTO: 44.5 % (ref 40–54)
HGB BLD-MCNC: 15.2 GM/DL (ref 14–18)
IMM GRANULOCYTES # BLD AUTO: 0.01 K/UL (ref 0–0.04)
MCH RBC QN AUTO: 35.2 PG (ref 27–31)
MCHC RBC AUTO-ENTMCNC: 34.2 G/DL (ref 32–36)
MCV RBC AUTO: 103 FL (ref 82–98)
PLATELET # BLD AUTO: 292 K/UL (ref 150–450)
PMV BLD AUTO: 9.6 FL (ref 9.2–12.9)
POTASSIUM SERPL-SCNC: 4.6 MMOL/L (ref 3.5–5.1)
PROT SERPL-MCNC: 8.3 GM/DL (ref 6–8.4)
PSA SERPL-MCNC: 2.76 NG/ML
RBC # BLD AUTO: 4.32 M/UL (ref 4.6–6.2)
SODIUM SERPL-SCNC: 140 MMOL/L (ref 136–145)
WBC # BLD AUTO: 3.89 K/UL (ref 3.9–12.7)

## 2025-04-11 PROCEDURE — 80053 COMPREHEN METABOLIC PANEL: CPT

## 2025-04-11 PROCEDURE — 85027 COMPLETE CBC AUTOMATED: CPT

## 2025-04-11 PROCEDURE — 36415 COLL VENOUS BLD VENIPUNCTURE: CPT

## 2025-04-11 PROCEDURE — 84153 ASSAY OF PSA TOTAL: CPT

## 2025-04-14 ENCOUNTER — OFFICE VISIT (OUTPATIENT)
Dept: HEMATOLOGY/ONCOLOGY | Facility: CLINIC | Age: 70
End: 2025-04-14
Payer: MEDICARE

## 2025-04-14 VITALS
TEMPERATURE: 98 F | DIASTOLIC BLOOD PRESSURE: 77 MMHG | SYSTOLIC BLOOD PRESSURE: 134 MMHG | BODY MASS INDEX: 24.07 KG/M2 | OXYGEN SATURATION: 97 % | HEIGHT: 71 IN | RESPIRATION RATE: 18 BRPM | HEART RATE: 77 BPM | WEIGHT: 171.94 LBS

## 2025-04-14 DIAGNOSIS — N40.1 BPH WITH URINARY OBSTRUCTION: ICD-10-CM

## 2025-04-14 DIAGNOSIS — T50.905A HOT FLASH DUE TO MEDICATION: ICD-10-CM

## 2025-04-14 DIAGNOSIS — Z79.818 ENCOUNTER FOR MONITORING ANDROGEN DEPRIVATION THERAPY: ICD-10-CM

## 2025-04-14 DIAGNOSIS — C61 PRIMARY ADENOCARCINOMA OF PROSTATE: Primary | ICD-10-CM

## 2025-04-14 DIAGNOSIS — R23.2 HOT FLASH DUE TO MEDICATION: ICD-10-CM

## 2025-04-14 DIAGNOSIS — N13.8 BPH WITH URINARY OBSTRUCTION: ICD-10-CM

## 2025-04-14 PROBLEM — R97.20 ELEVATED PSA: Status: RESOLVED | Noted: 2024-11-25 | Resolved: 2025-04-14

## 2025-04-14 PROCEDURE — 1125F AMNT PAIN NOTED PAIN PRSNT: CPT | Mod: CPTII,S$GLB,, | Performed by: HOSPITALIST

## 2025-04-14 PROCEDURE — 1101F PT FALLS ASSESS-DOCD LE1/YR: CPT | Mod: CPTII,S$GLB,, | Performed by: HOSPITALIST

## 2025-04-14 PROCEDURE — 3008F BODY MASS INDEX DOCD: CPT | Mod: CPTII,S$GLB,, | Performed by: HOSPITALIST

## 2025-04-14 PROCEDURE — 99999 PR PBB SHADOW E&M-EST. PATIENT-LVL III: CPT | Mod: PBBFAC,,, | Performed by: HOSPITALIST

## 2025-04-14 PROCEDURE — 3078F DIAST BP <80 MM HG: CPT | Mod: CPTII,S$GLB,, | Performed by: HOSPITALIST

## 2025-04-14 PROCEDURE — 3075F SYST BP GE 130 - 139MM HG: CPT | Mod: CPTII,S$GLB,, | Performed by: HOSPITALIST

## 2025-04-14 PROCEDURE — G2211 COMPLEX E/M VISIT ADD ON: HCPCS | Mod: S$GLB,,, | Performed by: HOSPITALIST

## 2025-04-14 PROCEDURE — 99215 OFFICE O/P EST HI 40 MIN: CPT | Mod: S$GLB,,, | Performed by: HOSPITALIST

## 2025-04-14 PROCEDURE — 3288F FALL RISK ASSESSMENT DOCD: CPT | Mod: CPTII,S$GLB,, | Performed by: HOSPITALIST

## 2025-04-14 RX ORDER — CALCIUM CITRATE/VITAMIN D3 500MG-12.5
1 TABLET,CHEWABLE ORAL 2 TIMES DAILY
Qty: 60 TABLET | Refills: 5 | Status: SHIPPED | OUTPATIENT
Start: 2025-04-14

## 2025-04-14 NOTE — ASSESSMENT & PLAN NOTE
-- Con't ADT + AAP  -- Next Lupron scheduled 05/30/2025  -- Con't safety labs q2 weeks through 5/30/25; then can change to q6 weeks and q12 week clinic visit  -- Encourage Ca/D for osteopenia  -- Will send [facility] with next labs  -- Has rad onc appt 07/2025

## 2025-04-14 NOTE — PROGRESS NOTES
[facility]: New patient visit  Best Contact Phone Number(s): [phone] (home)       Cancer/Stage/TNM:    Cancer Staging   Primary adenocarcinoma of prostate  Staging form: Prostate, AJCC 8th Edition  - Clinical: Stage JANIE (cT1c, cN1, cM0, PSA: 35.1, Grade Group: 3) - Signed by Orion Sheridan MD on 2/20/2025        Reason for visit: Regional node positive prostate cancer (T1cN1, [place] 4+3; PSA 29.3)    Molecular:  Germline Testing: TBD    Densitometry:  03/06/25: Lower risk osteopenia    Treatment History:   02/24/25 -    Abiraterone + Prednisone  03/07/25 -    ADT       HPI:   Gavin Balderrama is a 69 y.o. male with regional node positive prostate cancer. Initially diagnosed with [place] 4+3 disease 01/2025 in setting of elevated PSA to 29.3 and chronic obstructive LUTS. PSMA PET imaging 02/2025 with low volume PSMA avid iliac nodes. He started ADT + abiraterone 03/2025. He presents to medical oncology clinic for routine follow up.    Interval Events:    Has been on abiraterone + ADT for the last month. Hot flashes are 'ridiculous'. Has developed some recent sinusitis he associates with frequent hot flashes and poor sleep overnight. Also has hot flashes frequently throuhgout the day. Not interested in pharmacotherapy or     Also notes increased fatigue - notes he is 'lying around' most of the day. Motivation is generally low.  Has some ongoing modest urinary urgency along with hesitancy. No signifcant incontinence. Continues on tamsulosin. No other concerns.      Oncology History   Primary adenocarcinoma of prostate   9/30/2024 Imaging Significant Findings    09/30/24: Prostate MRI  Impression:  - Findings suggestive of prostatitis.  Benign prostatic hyperplasia.  No focal lesion suspicious for prostate malignancy.  - Overall Assessment: PI-RADS 2 - Low (clinically significant cancer is unlikely to be present).     1/27/2025 Biopsy    01/27/25: Prostate biopsy  Prostate adenocarcinoma.  9/12 template cores. Up to [place] 4+3     2/11/2025 Imaging Significant Findings    02/11/25 PSMA PET CT  Impression:  - Few tracer avid foci within the prostate.  Few bilateral subcentimeter iliac chain lymph nodes demonstrating mild tracer avidity, suspicious for metastatic foci.  No tracer avid lesions elsewhere.     2/20/2025 Initial Diagnosis    Primary adenocarcinoma of prostate     2/20/2025 Cancer Staged    Staging form: Prostate, AJCC 8th Edition  - Clinical: Stage JANIE (cT1c, cN1, cM0, PSA: 35.1, Grade Group: 3)       Physical Exam  Constitutional:       General: He is not in acute distress.     Appearance: Normal appearance.   HENT:      Head: Normocephalic.   Eyes:      General: No scleral icterus.     Extraocular Movements: Extraocular movements intact.      Conjunctiva/sclera: Conjunctivae normal.   Cardiovascular:      Rate and Rhythm: Normal rate.      Heart sounds: No murmur heard.  Pulmonary:      Effort: Pulmonary effort is normal. No respiratory distress.   Abdominal:      General: There is no distension.      Palpations: Abdomen is soft.   Skin:     General: Skin is warm and dry.   Neurological:      Mental Status: He is alert and oriented to person, place, and time.      Motor: No weakness.   Psychiatric:         Mood and Affect: Mood normal.         Behavior: Behavior normal.         Thought Content: Thought content normal.         Lab Results   Component Value Date    PSADIAG 29.7 (H) 03/14/2025    PSADIAG 31.6 (H) 03/07/2025    PSADIAG 35.1 (H) 02/20/2025    PSADIAG 29.3 (H) 11/25/2024    PSADIAG 24.4 (H) 08/26/2024    PSADIAG 17.6 (H) 07/10/2024    PSA 2.76 04/11/2025    PSA 8.96 (H) 03/28/2025       1. Primary adenocarcinoma of prostate  Overview:  [place] 4+3 on prostate biopsy 01/27/2025. PSMA PET with few tracer avid foci within the prostate and few bilateral subcentimeter iliac chain lymph nodes demonstrating mild tracer avidity.     Assessment & Plan:  -- Con't ADT + AAP  -- Next  Lupron scheduled 05/30/2025  -- Con't safety labs q2 weeks through 5/30/25; then can change to q6 weeks and q12 week clinic visit  -- Encourage Ca/D for osteopenia  -- Will send [facility] with next labs  -- Has rad onc appt 07/2025      Orders:  -     calcium citrate-vitamin D3 500 mg-12.5 mcg (500 unit) Chew; Take 1 tablet by mouth 2 (two) times a day.  Dispense: 60 tablet; Refill: 5  -     [facility] - Hereditary Cancer with RNA; Future; Expected date: 04/14/2025    2. Encounter for monitoring androgen deprivation therapy  -     calcium citrate-vitamin D3 500 mg-12.5 mcg (500 unit) Chew; Take 1 tablet by mouth 2 (two) times a day.  Dispense: 60 tablet; Refill: 5    3. BPH with urinary obstruction  Assessment & Plan:  - Continues on tamsulosin  - Symptoms are stable      4. Hot flash due to medication  Assessment & Plan:  - Declined pharmacotherapy  - Also declined integrative oncology referral                    Follow up:   Route Chart for Scheduling    Med Onc Chart Routing      Follow up with physician    Follow up with [facility] . 5/30/25   Infusion scheduling note    Injection scheduling note    Labs CBC, CMP and PSA   Scheduling:  Preferred lab:  Lab interval:  CMP q2 weeks through 5/30/25 at [facility]; full labs including [facility] Hereditary with next clinic follow up   Imaging    Pharmacy appointment    Other referrals                     Supportive Plan Information  OP PROSTATE LEUPROLIDE Q3MO Orion Sheridan MD   Associated Diagnosis: Primary adenocarcinoma of prostate Stage JANIE cT1c, cN1, cM0, PSA: 35.1, Grade Group: 3 noted on 2/20/2025   Line of treatment: First Line   Treatment goal: Curative     Upcoming Treatment Dates - OP PROSTATE LEUPROLIDE Q3MO    5/29/2025       Chemotherapy       leuprolide (LUPRON) injection 22.5 mg  8/21/2025       Chemotherapy       leuprolide (LUPRON) injection 22.5 mg  11/13/2025       Chemotherapy       leuprolide (LUPRON) injection 22.5 mg  2/5/2026       Chemotherapy        leuprolide (LUPRON) injection 22.5 mg         The above information has been reviewed with the patient and all questions have been answered to their apparent satisfaction.  They understand that they can call the clinic with any questions.    Orion Sheridan MD MPH  Staff Physician     [facility] [address] [address]  Email: [email or web address]  Phone: o) [phone] (c) [phone]

## 2025-04-25 ENCOUNTER — LAB VISIT (OUTPATIENT)
Dept: LAB | Facility: OTHER | Age: 70
End: 2025-04-25
Attending: NURSE PRACTITIONER
Payer: MEDICARE

## 2025-04-25 DIAGNOSIS — C61 PRIMARY ADENOCARCINOMA OF PROSTATE: ICD-10-CM

## 2025-04-25 LAB
ABSOLUTE EOSINOPHIL (OHS): 0.16 K/UL
ABSOLUTE MONOCYTE (OHS): 0.59 K/UL (ref 0.3–1)
ABSOLUTE NEUTROPHIL COUNT (OHS): 2.35 K/UL (ref 1.8–7.7)
ALBUMIN SERPL BCP-MCNC: 4.2 G/DL (ref 3.5–5.2)
ALP SERPL-CCNC: 75 UNIT/L (ref 40–150)
ALT SERPL W/O P-5'-P-CCNC: 20 UNIT/L (ref 10–44)
ANION GAP (OHS): 8 MMOL/L (ref 8–16)
AST SERPL-CCNC: 18 UNIT/L (ref 11–45)
BASOPHILS # BLD AUTO: 0.02 K/UL
BASOPHILS NFR BLD AUTO: 0.4 %
BILIRUB SERPL-MCNC: 0.4 MG/DL (ref 0.1–1)
BUN SERPL-MCNC: 23 MG/DL (ref 8–23)
CALCIUM SERPL-MCNC: 10 MG/DL (ref 8.7–10.5)
CHLORIDE SERPL-SCNC: 105 MMOL/L (ref 95–110)
CO2 SERPL-SCNC: 29 MMOL/L (ref 23–29)
CREAT SERPL-MCNC: 1.2 MG/DL (ref 0.5–1.4)
ERYTHROCYTE [DISTWIDTH] IN BLOOD BY AUTOMATED COUNT: 13.3 % (ref 11.5–14.5)
GFR SERPLBLD CREATININE-BSD FMLA CKD-EPI: >60 ML/MIN/1.73/M2
GLUCOSE SERPL-MCNC: 79 MG/DL (ref 70–110)
HCT VFR BLD AUTO: 44.7 % (ref 40–54)
HGB BLD-MCNC: 14.5 GM/DL (ref 14–18)
IMM GRANULOCYTES # BLD AUTO: 0.07 K/UL (ref 0–0.04)
IMM GRANULOCYTES NFR BLD AUTO: 1.4 % (ref 0–0.5)
LYMPHOCYTES # BLD AUTO: 1.76 K/UL (ref 1–4.8)
MCH RBC QN AUTO: 34.8 PG (ref 27–31)
MCHC RBC AUTO-ENTMCNC: 32.4 G/DL (ref 32–36)
MCV RBC AUTO: 107 FL (ref 82–98)
NUCLEATED RBC (/100WBC) (OHS): 0 /100 WBC
PLATELET # BLD AUTO: 315 K/UL (ref 150–450)
PMV BLD AUTO: 9.2 FL (ref 9.2–12.9)
POTASSIUM SERPL-SCNC: 4 MMOL/L (ref 3.5–5.1)
PROT SERPL-MCNC: 7.9 GM/DL (ref 6–8.4)
PSA SERPL-MCNC: 0.74 NG/ML
RBC # BLD AUTO: 4.17 M/UL (ref 4.6–6.2)
RELATIVE EOSINOPHIL (OHS): 3.2 %
RELATIVE LYMPHOCYTE (OHS): 35.6 % (ref 18–48)
RELATIVE MONOCYTE (OHS): 11.9 % (ref 4–15)
RELATIVE NEUTROPHIL (OHS): 47.5 % (ref 38–73)
SODIUM SERPL-SCNC: 142 MMOL/L (ref 136–145)
WBC # BLD AUTO: 4.95 K/UL (ref 3.9–12.7)

## 2025-04-25 PROCEDURE — 84153 ASSAY OF PSA TOTAL: CPT

## 2025-04-25 PROCEDURE — 80053 COMPREHEN METABOLIC PANEL: CPT

## 2025-04-25 PROCEDURE — 85025 COMPLETE CBC W/AUTO DIFF WBC: CPT

## 2025-04-25 PROCEDURE — 36415 COLL VENOUS BLD VENIPUNCTURE: CPT

## 2025-04-28 ENCOUNTER — RESULTS FOLLOW-UP (OUTPATIENT)
Dept: HEMATOLOGY/ONCOLOGY | Facility: CLINIC | Age: 70
End: 2025-04-28

## 2025-05-30 ENCOUNTER — LAB VISIT (OUTPATIENT)
Dept: LAB | Facility: HOSPITAL | Age: 70
End: 2025-05-30
Attending: NURSE PRACTITIONER
Payer: MEDICARE

## 2025-05-30 ENCOUNTER — OFFICE VISIT (OUTPATIENT)
Dept: HEMATOLOGY/ONCOLOGY | Facility: CLINIC | Age: 70
End: 2025-05-30
Payer: MEDICARE

## 2025-05-30 ENCOUNTER — INFUSION (OUTPATIENT)
Dept: INFUSION THERAPY | Facility: HOSPITAL | Age: 70
End: 2025-05-30
Payer: MEDICARE

## 2025-05-30 VITALS
WEIGHT: 170.63 LBS | SYSTOLIC BLOOD PRESSURE: 119 MMHG | DIASTOLIC BLOOD PRESSURE: 69 MMHG | HEART RATE: 70 BPM | HEIGHT: 71 IN | RESPIRATION RATE: 18 BRPM | BODY MASS INDEX: 23.89 KG/M2 | OXYGEN SATURATION: 97 %

## 2025-05-30 DIAGNOSIS — Z79.899 LONG TERM CURRENT USE OF THERAPEUTIC DRUG: ICD-10-CM

## 2025-05-30 DIAGNOSIS — Z79.818 ENCOUNTER FOR MONITORING ANDROGEN DEPRIVATION THERAPY: ICD-10-CM

## 2025-05-30 DIAGNOSIS — C61 PRIMARY ADENOCARCINOMA OF PROSTATE: Primary | ICD-10-CM

## 2025-05-30 DIAGNOSIS — T50.905A HOT FLASH DUE TO MEDICATION: ICD-10-CM

## 2025-05-30 DIAGNOSIS — N40.1 BPH WITH URINARY OBSTRUCTION: Primary | ICD-10-CM

## 2025-05-30 DIAGNOSIS — R23.2 HOT FLASH DUE TO MEDICATION: ICD-10-CM

## 2025-05-30 DIAGNOSIS — N13.8 BPH WITH URINARY OBSTRUCTION: Primary | ICD-10-CM

## 2025-05-30 DIAGNOSIS — C61 PRIMARY ADENOCARCINOMA OF PROSTATE: ICD-10-CM

## 2025-05-30 LAB
ABSOLUTE EOSINOPHIL (OHS): 0.18 K/UL
ABSOLUTE MONOCYTE (OHS): 0.58 K/UL (ref 0.3–1)
ABSOLUTE NEUTROPHIL COUNT (OHS): 1.98 K/UL (ref 1.8–7.7)
ALBUMIN SERPL BCP-MCNC: 4.2 G/DL (ref 3.5–5.2)
ALP SERPL-CCNC: 80 UNIT/L (ref 40–150)
ALT SERPL W/O P-5'-P-CCNC: 13 UNIT/L (ref 10–44)
ANION GAP (OHS): 10 MMOL/L (ref 8–16)
AST SERPL-CCNC: 17 UNIT/L (ref 11–45)
BASOPHILS # BLD AUTO: 0.05 K/UL
BASOPHILS NFR BLD AUTO: 1.2 %
BILIRUB SERPL-MCNC: 0.4 MG/DL (ref 0.1–1)
BUN SERPL-MCNC: 19 MG/DL (ref 8–23)
CALCIUM SERPL-MCNC: 9.9 MG/DL (ref 8.7–10.5)
CHLORIDE SERPL-SCNC: 104 MMOL/L (ref 95–110)
CO2 SERPL-SCNC: 29 MMOL/L (ref 23–29)
CREAT SERPL-MCNC: 1.2 MG/DL (ref 0.5–1.4)
ERYTHROCYTE [DISTWIDTH] IN BLOOD BY AUTOMATED COUNT: 13 % (ref 11.5–14.5)
GFR SERPLBLD CREATININE-BSD FMLA CKD-EPI: >60 ML/MIN/1.73/M2
GLUCOSE SERPL-MCNC: 61 MG/DL (ref 70–110)
HCT VFR BLD AUTO: 41.2 % (ref 40–54)
HGB BLD-MCNC: 13.5 GM/DL (ref 14–18)
IMM GRANULOCYTES # BLD AUTO: 0.02 K/UL (ref 0–0.04)
IMM GRANULOCYTES NFR BLD AUTO: 0.5 % (ref 0–0.5)
LYMPHOCYTES # BLD AUTO: 1.52 K/UL (ref 1–4.8)
MCH RBC QN AUTO: 33.9 PG (ref 27–31)
MCHC RBC AUTO-ENTMCNC: 32.8 G/DL (ref 32–36)
MCV RBC AUTO: 104 FL (ref 82–98)
NUCLEATED RBC (/100WBC) (OHS): 0 /100 WBC
PLATELET # BLD AUTO: 270 K/UL (ref 150–450)
PMV BLD AUTO: 10.1 FL (ref 9.2–12.9)
POTASSIUM SERPL-SCNC: 3.9 MMOL/L (ref 3.5–5.1)
PROT SERPL-MCNC: 7.1 GM/DL (ref 6–8.4)
PSA SERPL-MCNC: 0.15 NG/ML
RBC # BLD AUTO: 3.98 M/UL (ref 4.6–6.2)
RELATIVE EOSINOPHIL (OHS): 4.2 %
RELATIVE LYMPHOCYTE (OHS): 35.1 % (ref 18–48)
RELATIVE MONOCYTE (OHS): 13.4 % (ref 4–15)
RELATIVE NEUTROPHIL (OHS): 45.6 % (ref 38–73)
SODIUM SERPL-SCNC: 143 MMOL/L (ref 136–145)
WBC # BLD AUTO: 4.33 K/UL (ref 3.9–12.7)

## 2025-05-30 PROCEDURE — 99999 PR PBB SHADOW E&M-EST. PATIENT-LVL III: CPT | Mod: PBBFAC,,, | Performed by: NURSE PRACTITIONER

## 2025-05-30 PROCEDURE — 85025 COMPLETE CBC W/AUTO DIFF WBC: CPT

## 2025-05-30 PROCEDURE — 63600175 PHARM REV CODE 636 W HCPCS: Mod: JZ,TB | Performed by: NURSE PRACTITIONER

## 2025-05-30 PROCEDURE — 82040 ASSAY OF SERUM ALBUMIN: CPT

## 2025-05-30 PROCEDURE — 96402 CHEMO HORMON ANTINEOPL SQ/IM: CPT

## 2025-05-30 PROCEDURE — 36415 COLL VENOUS BLD VENIPUNCTURE: CPT

## 2025-05-30 PROCEDURE — 84153 ASSAY OF PSA TOTAL: CPT

## 2025-05-30 RX ADMIN — LEUPROLIDE ACETATE 22.5 MG: KIT at 01:05

## 2025-05-30 NOTE — ASSESSMENT & PLAN NOTE
-- Con't ADT + AAP  -- Next Lupron scheduled 05/30/2025  -- Con't safety labs q6 weeks and q12 week clinic visit  -- Encourage Ca/D for osteopenia  -- sent [facility] with today's labs  -- Has rad onc appt 07/2025 and MRI scheduled  -- PSA responding

## 2025-05-30 NOTE — PROGRESS NOTES
[facility] - NEW PATIENT VISIT     Best Contact Phone Number(s): [phone] (home)       Cancer/Stage/TNM:    Cancer Staging   Primary adenocarcinoma of prostate  Staging form: Prostate, AJCC 8th Edition  - Clinical: Stage JANIE (cT1c, cN1, cM0, PSA: 35.1, Grade Group: 3) - Signed by Orion Sheridan MD on 2/20/2025        Reason for visit:   Chief Complaint   Patient presents with    Primary adenocarcinoma of prostate        HPI:   Gavin Balderrama is a 69 y.o. male with regional node positive prostate cancer. Initially diagnosed with [place] 4+3 disease 01/2025 in setting of elevated PSA to 29.3 and chronic obstructive LUTS. PSMA PET imaging 02/2025 with low volume PSMA avid iliac nodes. He started ADT + abiraterone 03/2025. He presents to medical oncology clinic for routine follow up.     Interval history:  Presents today alone to clinic for follow up of prostate cancer.  Currently being tgreated with ADT + AAP. Energy is low.  Taking frequent naps.  Not exercising.  Hot flashes continue, but aren't as bad.  PSA responding well. PSA 0.15 from 0.74. has follow up with radiation oncology scheduled in July including MRI.    Had to move out of house due to housework.  This is causing him stress.  [place] for transportation.    History has been obtained by chart review and discussion with the patient.     Oncology History   Primary adenocarcinoma of prostate   9/30/2024 Imaging Significant Findings    09/30/24: Prostate MRI  Impression:  - Findings suggestive of prostatitis.  Benign prostatic hyperplasia.  No focal lesion suspicious for prostate malignancy.  - Overall Assessment: PI-RADS 2 - Low (clinically significant cancer is unlikely to be present).     1/27/2025 Biopsy    01/27/25: Prostate biopsy  Prostate adenocarcinoma. 9/12 template cores. Up to [place] 4+3     2/11/2025 Imaging Significant Findings    02/11/25 PSMA PET CT  Impression:  - Few  "tracer avid foci within the prostate.  Few bilateral subcentimeter iliac chain lymph nodes demonstrating mild tracer avidity, suspicious for metastatic foci.  No tracer avid lesions elsewhere.     2/20/2025 Initial Diagnosis    Primary adenocarcinoma of prostate     2/20/2025 Cancer Staged    Staging form: Prostate, AJCC 8th Edition  - Clinical: Stage JANIE (cT1c, cN1, cM0, PSA: 35.1, Grade Group: 3)           Past Medical History:   Diagnosis Date    BPH (benign prostatic hyperplasia)     Disorder of thyroid, unspecified          No past surgical history on file.      I have reviewed and updated the patient's past medical, surgical, family and social histories.     Review of patient's allergies indicates:   Allergen Reactions    Grass pollen-june grass standard          Current Medications[1]    Review of Systems   Constitutional:  Positive for malaise/fatigue and weight loss. Negative for chills and fever.        Hot flashes   Respiratory:  Negative for cough and shortness of breath.         May have allergies - going to try flonase   Cardiovascular:  Negative for chest pain and palpitations.   Gastrointestinal:  Positive for constipation (BM every 2 days). Negative for diarrhea.        Bloating   Genitourinary:  Negative for dysuria, flank pain, frequency, hematuria and urgency.   Neurological:  Negative for dizziness and weakness.          Objective:      Physical Exam:   /69 (BP Location: Left arm, Patient Position: Sitting)   Pulse 70   Resp 18   Ht 5' 11" (1.803 m)   Wt 77.4 kg (170 lb 10.2 oz)   SpO2 97%   BMI 23.80 kg/m²       ECOG Performance status: (1) Restricted in physically strenuous activity, ambulatory and able to do work of light nature     Physical Exam  Vitals reviewed.   Constitutional:       Appearance: Normal appearance.   HENT:      Head: Normocephalic.   Cardiovascular:      Rate and Rhythm: Normal rate.      Pulses: Normal pulses.   Pulmonary:      Effort: Pulmonary effort is " normal.   Abdominal:      Palpations: Abdomen is soft.   Musculoskeletal:         General: Normal range of motion.      Cervical back: Normal range of motion.   Skin:     General: Skin is warm and dry.      Capillary Refill: Capillary refill takes less than 2 seconds.   Neurological:      Mental Status: He is alert and oriented to person, place, and time.          Recent Labs:   Lab Results   Component Value Date    WBC 4.33 05/30/2025    RBC 3.98 (L) 05/30/2025    HGB 13.5 (L) 05/30/2025    HCT 41.2 05/30/2025     (H) 05/30/2025    MCH 33.9 (H) 05/30/2025    MCHC 32.8 05/30/2025    RDW 13.0 05/30/2025     05/30/2025    MPV 10.1 05/30/2025    IMMGR 0.5 05/30/2025    GRAN 2.7 03/14/2025    GRAN 53.7 03/14/2025    IGABS 0.02 05/30/2025    LYMPH 35.1 05/30/2025    LYMPH 1.52 05/30/2025    MONO 13.4 05/30/2025    MONO 0.58 05/30/2025    EOS 4.2 05/30/2025    EOS 0.18 05/30/2025    BASO 0.05 03/14/2025    NRBC 0 05/30/2025    EOSINOPHIL 2.6 03/14/2025    BASOPHIL 1.2 05/30/2025    BASOPHIL 0.05 05/30/2025    DIFFMETHOD Automated 03/14/2025       Lab Results   Component Value Date     05/30/2025    K 3.9 05/30/2025     05/30/2025    CO2 29 05/30/2025    GLU 61 (L) 05/30/2025    BUN 19 05/30/2025    CREATININE 1.2 05/30/2025    CALCIUM 9.9 05/30/2025    PROT 7.1 05/30/2025    ALBUMIN 4.2 05/30/2025    BILITOT 0.4 05/30/2025    ALKPHOS 80 05/30/2025    AST 17 05/30/2025    ALT 13 05/30/2025    ANIONGAP 10 05/30/2025    EGFRNORACEVR >60 05/30/2025        Lab Results   Component Value Date    PSADIAG 29.7 (H) 03/14/2025    PSADIAG 31.6 (H) 03/07/2025    PSADIAG 35.1 (H) 02/20/2025    PSADIAG 29.3 (H) 11/25/2024    PSADIAG 24.4 (H) 08/26/2024    PSADIAG 17.6 (H) 07/10/2024    PSA 0.15 05/30/2025    PSA 0.74 04/25/2025    PSA 2.76 04/11/2025    PSA 8.96 (H) 03/28/2025        Cardiovascular Screening:  Primary care physician: Stephan Hui Jr., MD      The 10-year ASCVD risk score (Madhavi MCDONALD, et al.,  "2019) is: 15%    Values used to calculate the score:      Age: 69 years      Sex: Male      Is Non- : Yes      Diabetic: No      Tobacco smoker: Yes      Systolic Blood Pressure: 119 mmHg      Is BP treated: No      HDL Cholesterol: 63 mg/dL      Total Cholesterol: 152 mg/dL    ASCVD Risk Level: Intermediate 7.5% - 19.9%    EKG:   Results for orders placed or performed during the hospital encounter of 07/15/24   SCHEDULED EKG 12-LEAD (to Muse)    Collection Time: 07/15/24 12:22 PM   Result Value Ref Range    QRS Duration 72 ms    OHS QTC Calculation 420 ms    Narrative    Test Reason : R00.1,    Vent. Rate : 064 BPM     Atrial Rate : 064 BPM     P-R Int : 182 ms          QRS Dur : 072 ms      QT Int : 408 ms       P-R-T Axes : 078 063 079 degrees     QTc Int : 420 ms    Normal sinus rhythm  Low septal forces  Otherwise normal ECG  No previous ECGs available  Confirmed by Yobany Dexter MD ([id number]) on 7/15/2024 12:34:27 PM    Referred By: NICK MARIE           Confirmed By:Yobany Dexter MD       High blood pressure = No  Antihypertensive agents: This patient does not have an active medication from one of the medication groupers.   Comments:     DM2: No  Antidiabetic agents: This patient does not have an active medication from one of the medication groupers.   Comments:     Hyperlipidemia: No  Lipid lowering agents: [unfilled]   Comments:     Antiplatelet therapy: No  Agent: This patient does not have an active medication from one of the medication groupers.   Comment:     Body mass index is 23.8 kg/m².  If greater than 30, referral to nutrition    Lab Results   Component Value Date    CHOL 152 07/10/2024    LDLCALC 80.2 07/10/2024    HDL 63 07/10/2024    TRIG 44 07/10/2024    HGBA1C 4.9 07/10/2024        Bone Health    No results found for: "HZZTXIZC053C", "TEKUOEGV43TO"     Results for orders placed during the hospital encounter of 03/06/25    DXA Bone Density Axial Skeleton 1 or " more sites    Impression  *Low bone mass (Osteopenia); FRAX calculations do not support treatment as osteoporosis.  Wide variation in BMD between L2-L4 vertebral bodies.    RECOMMENDATIONS:  *Daily calcium intake 0078-9071 mg, dietary sources preferred; Vitamin D 0291-9707 IU daily.  *Weight bearing exercise and fall precautions.  *If patient smokes would recommend cessation.  *If dedicated imaging is negative for vertebral fracture, then no additional treatment is recommended at this time. If positive for fracture, would treat as osteoporosis.  *Repeat BMD in 2 years.      Electronically signed by: Christal Mendez MD  Date:    03/10/2025  Time:    10:00       Vitamin D: 1000 IU daily  Calcium: Recommend 4 servings of dairy daily     Osteopenia/Osteoporosis: Osteopenia    Bone strengthening agent: None     Staging Imaging     Results for orders placed during the hospital encounter of 02/11/25    NM PET CT F 18 PYL PSMA, Midthigh to Vertex    Impression  Few tracer avid foci within the prostate.  Few bilateral subcentimeter iliac chain lymph nodes demonstrating mild tracer avidity, suspicious for metastatic foci.  No tracer avid lesions elsewhere.    Electronically signed by resident: Melina Castillo  Date:    02/11/2025  Time:    16:37    Electronically signed by: Ren España  Date:    02/11/2025  Time:    17:27       No results found for this or any previous visit.      Results for orders placed during the hospital encounter of 09/30/24    MRI Prostate W W/O Contrast    Impression  Findings suggestive of prostatitis.  Benign prostatic hyperplasia.  No focal lesion suspicious for prostate malignancy.    Overall Assessment: PI-RADS 2 - Low (clinically significant cancer is unlikely to be present).    Number of targets created for potential MR/US fusion biopsy:    Peripheral zone: 0    Transition zone: 0    Electronically signed by resident: Josse  Loki  Date:    10/01/2024  Time:    09:16    Electronically signed by: Ted Reese MD  Date:    10/01/2024  Time:    10:10       No results found for this or any previous visit.       I have personally reviewed the above imaging.     Path:   Reviewed pathology as documented above.    Genomic testing:     Germline genetic testing  No results found for this or any previous visit.     Somatic tumor genotyping:      ctDNA genotyping:       Diagnoses:     1. BPH with urinary obstruction    2. Primary adenocarcinoma of prostate    3. Encounter for monitoring androgen deprivation therapy    4. Long term current use of therapeutic drug    5. Hot flash due to medication          Assessment and Plan:     Assessment & Plan  Primary adenocarcinoma of prostate  -- Con't ADT + AAP  -- Next Lupron scheduled 05/30/2025  -- Con't safety labs q6 weeks and q12 week clinic visit  -- Encourage Ca/D for osteopenia  -- sent Tempus xG with today's labs  -- Has rad onc appt 07/2025 and MRI scheduled  -- PSA responding     Encounter for monitoring androgen deprivation therapy  Continue leuprolide every 3 mths  Injection due today  Long term current use of therapeutic drug  See adt  Hot flash due to medication  - Declined pharmacotherapy  - Also declined integrative oncology referral  BPH with urinary obstruction  - Continues on tamsulosin  - Symptoms are stable      Follow up:   Route Chart for Scheduling    Med Onc Chart Routing      Follow up with physician 3 months. rtc with labs (cbc, cmp, psa), leuprolide, and to see Dr. Sheridan   Follow up with [facility] 6 months. rtc with labs (cbc, cmp, psa), leuprolide, and to see  [facility]   Infusion scheduling note    Injection scheduling note leuprolide every 3 months   Labs CBC, CMP and PSA   Scheduling:  Preferred lab:  Lab interval: every 6 weeks  cbc, cmp, psa   Imaging    Pharmacy appointment    Other referrals                   Supportive Plan Information  OP PROSTATE LEUPROLIDE Q3MO Orion FORBES  MD Fatimah   Associated Diagnosis: Primary adenocarcinoma of prostate Stage JANIE cT1c, cN1, cM0, PSA: 35.1, Grade Group: 3 noted on 2/20/2025   Line of treatment: First Line   Treatment goal: Curative     Upcoming Treatment Dates - OP PROSTATE LEUPROLIDE Q3MO    5/29/2025       Chemotherapy       leuprolide (LUPRON) injection 22.5 mg  8/21/2025       Chemotherapy       leuprolide (LUPRON) injection 22.5 mg  11/13/2025       Chemotherapy       leuprolide (LUPRON) injection 22.5 mg  2/5/2026       Chemotherapy       leuprolide (LUPRON) injection 22.5 mg         The above information has been reviewed with the patient and all questions have been answered to their apparent satisfaction.  They understand that they can call the clinic with any questions.    Joanna Nicolas           [1]   Current Outpatient Medications   Medication Sig Dispense Refill    abiraterone (ZYTIGA) 250 mg Tab Take 1 tablet (250 mg total) by mouth once daily Take with low fat meal. (Patient not taking: Reported on 2/24/2025.) 30 tablet 11    calcium citrate-vitamin D3 500 mg-12.5 mcg (500 unit) Chew Take 1 tablet by mouth 2 (two) times a day. 60 tablet 5    levothyroxine (SYNTHROID, LEVOTHROID) 175 MCG tablet Take 1 tablet (175 mcg total) by mouth every morning. 90 tablet 3    predniSONE (DELTASONE) 5 MG tablet Take 1 tablet (5 mg total) by mouth once daily. (Patient not taking: Reported on 2/24/2025) 90 tablet 3    tamsulosin (FLOMAX) 0.4 mg Cap Take 2 capsules (0.8 mg total) by mouth once daily. 180 capsule 3     No current facility-administered medications for this visit.

## 2025-06-23 DIAGNOSIS — C61 PRIMARY ADENOCARCINOMA OF PROSTATE: Primary | ICD-10-CM

## 2025-06-25 ENCOUNTER — PATIENT MESSAGE (OUTPATIENT)
Dept: HEMATOLOGY/ONCOLOGY | Facility: CLINIC | Age: 70
End: 2025-06-25
Payer: MEDICARE

## 2025-07-02 ENCOUNTER — RESULTS FOLLOW-UP (OUTPATIENT)
Dept: HEMATOLOGY/ONCOLOGY | Facility: CLINIC | Age: 70
End: 2025-07-02

## 2025-07-11 ENCOUNTER — LAB VISIT (OUTPATIENT)
Dept: LAB | Facility: HOSPITAL | Age: 70
End: 2025-07-11
Payer: MEDICARE

## 2025-07-11 DIAGNOSIS — C61 PRIMARY ADENOCARCINOMA OF PROSTATE: ICD-10-CM

## 2025-07-11 LAB
ABSOLUTE EOSINOPHIL (OHS): 0.21 K/UL
ABSOLUTE MONOCYTE (OHS): 0.47 K/UL (ref 0.3–1)
ABSOLUTE NEUTROPHIL COUNT (OHS): 1.93 K/UL (ref 1.8–7.7)
ALBUMIN SERPL BCP-MCNC: 3.8 G/DL (ref 3.5–5.2)
ALP SERPL-CCNC: 75 UNIT/L (ref 40–150)
ALT SERPL W/O P-5'-P-CCNC: 14 UNIT/L (ref 10–44)
ANION GAP (OHS): 9 MMOL/L (ref 8–16)
AST SERPL-CCNC: 16 UNIT/L (ref 11–45)
BASOPHILS # BLD AUTO: 0.03 K/UL
BASOPHILS NFR BLD AUTO: 0.8 %
BILIRUB SERPL-MCNC: 0.4 MG/DL (ref 0.1–1)
BUN SERPL-MCNC: 11 MG/DL (ref 8–23)
CALCIUM SERPL-MCNC: 9.9 MG/DL (ref 8.7–10.5)
CHLORIDE SERPL-SCNC: 104 MMOL/L (ref 95–110)
CO2 SERPL-SCNC: 31 MMOL/L (ref 23–29)
CREAT SERPL-MCNC: 1.1 MG/DL (ref 0.5–1.4)
ERYTHROCYTE [DISTWIDTH] IN BLOOD BY AUTOMATED COUNT: 13 % (ref 11.5–14.5)
GFR SERPLBLD CREATININE-BSD FMLA CKD-EPI: >60 ML/MIN/1.73/M2
GLUCOSE SERPL-MCNC: 55 MG/DL (ref 70–110)
HCT VFR BLD AUTO: 37.5 % (ref 40–54)
HGB BLD-MCNC: 12.6 GM/DL (ref 14–18)
IMM GRANULOCYTES # BLD AUTO: 0.01 K/UL (ref 0–0.04)
IMM GRANULOCYTES NFR BLD AUTO: 0.3 % (ref 0–0.5)
LYMPHOCYTES # BLD AUTO: 1.14 K/UL (ref 1–4.8)
MCH RBC QN AUTO: 35 PG (ref 27–31)
MCHC RBC AUTO-ENTMCNC: 33.6 G/DL (ref 32–36)
MCV RBC AUTO: 104 FL (ref 82–98)
NUCLEATED RBC (/100WBC) (OHS): 0 /100 WBC
PLATELET # BLD AUTO: 276 K/UL (ref 150–450)
PMV BLD AUTO: 9.9 FL (ref 9.2–12.9)
POTASSIUM SERPL-SCNC: 3.4 MMOL/L (ref 3.5–5.1)
PROT SERPL-MCNC: 6.5 GM/DL (ref 6–8.4)
PSA SERPL-MCNC: 0.08 NG/ML
RBC # BLD AUTO: 3.6 M/UL (ref 4.6–6.2)
RELATIVE EOSINOPHIL (OHS): 5.5 %
RELATIVE LYMPHOCYTE (OHS): 30.1 % (ref 18–48)
RELATIVE MONOCYTE (OHS): 12.4 % (ref 4–15)
RELATIVE NEUTROPHIL (OHS): 50.9 % (ref 38–73)
SODIUM SERPL-SCNC: 144 MMOL/L (ref 136–145)
WBC # BLD AUTO: 3.79 K/UL (ref 3.9–12.7)

## 2025-07-11 PROCEDURE — 80053 COMPREHEN METABOLIC PANEL: CPT

## 2025-07-11 PROCEDURE — 85025 COMPLETE CBC W/AUTO DIFF WBC: CPT

## 2025-07-11 PROCEDURE — 36415 COLL VENOUS BLD VENIPUNCTURE: CPT

## 2025-07-11 PROCEDURE — 84153 ASSAY OF PSA TOTAL: CPT

## 2025-07-14 ENCOUNTER — HOSPITAL ENCOUNTER (OUTPATIENT)
Dept: RADIOLOGY | Facility: HOSPITAL | Age: 70
Discharge: HOME | End: 2025-07-14
Attending: STUDENT IN AN ORGANIZED HEALTH CARE EDUCATION/TRAINING PROGRAM
Payer: MEDICARE

## 2025-07-14 DIAGNOSIS — C61 PROSTATE CANCER: ICD-10-CM

## 2025-07-14 PROCEDURE — 72197 MRI PELVIS W/O & W/DYE: CPT | Mod: 26,,, | Performed by: RADIOLOGY

## 2025-07-14 PROCEDURE — 25500020 PHARM REV CODE 255: Performed by: STUDENT IN AN ORGANIZED HEALTH CARE EDUCATION/TRAINING PROGRAM

## 2025-07-14 PROCEDURE — 72197 MRI PELVIS W/O & W/DYE: CPT | Mod: TC

## 2025-07-14 PROCEDURE — A9585 GADOBUTROL INJECTION: HCPCS | Performed by: STUDENT IN AN ORGANIZED HEALTH CARE EDUCATION/TRAINING PROGRAM

## 2025-07-14 RX ORDER — GADOBUTROL 604.72 MG/ML
10 INJECTION INTRAVENOUS
Status: COMPLETED | OUTPATIENT
Start: 2025-07-14 | End: 2025-07-14

## 2025-07-14 RX ADMIN — GADOBUTROL 10 ML: 604.72 INJECTION INTRAVENOUS at 01:07

## 2025-07-21 ENCOUNTER — PATIENT MESSAGE (OUTPATIENT)
Dept: ADMINISTRATIVE | Facility: HOSPITAL | Age: 70
End: 2025-07-21
Payer: MEDICARE

## 2025-07-21 ENCOUNTER — OFFICE VISIT (OUTPATIENT)
Dept: RADIATION ONCOLOGY | Facility: CLINIC | Age: 70
End: 2025-07-21
Payer: MEDICARE

## 2025-07-21 DIAGNOSIS — C61 PRIMARY ADENOCARCINOMA OF PROSTATE: Primary | ICD-10-CM

## 2025-07-21 PROCEDURE — 1159F MED LIST DOCD IN RCRD: CPT | Mod: CPTII,NDTC,, | Performed by: STUDENT IN AN ORGANIZED HEALTH CARE EDUCATION/TRAINING PROGRAM

## 2025-07-21 PROCEDURE — 1160F RVW MEDS BY RX/DR IN RCRD: CPT | Mod: CPTII,NDTC,, | Performed by: STUDENT IN AN ORGANIZED HEALTH CARE EDUCATION/TRAINING PROGRAM

## 2025-07-21 PROCEDURE — 98016 BRIEF COMUNICAJ TECH-BSD SVC: CPT | Mod: NDTC,,, | Performed by: STUDENT IN AN ORGANIZED HEALTH CARE EDUCATION/TRAINING PROGRAM

## 2025-07-21 NOTE — PROGRESS NOTES
Established Patient - Audio Only Telehealth Visit     The patient location is: home    The chief complaint leading to consultation is: prostate cancer, follow-up x 1    Visit type: Virtual visit with audio only (telephone)    Time spent with medical discussion with the patient: 5 min    Total time spent on the encounter: 10 min        The reason for the audio only service rather than synchronous audio and video virtual visit was related to technical difficulties or patient preference/necessity.     Each patient to whom I provide medical services by telemedicine is:  (1) informed of the relationship between the physician and patient and the respective role of any other health care provider with respect to management of the patient; and (2) notified that they may decline to receive medical services by telemedicine and may withdraw from such care at any time. Patient verbally consented to receive this service via voice-only telephone call.     This service was not originating from a related E/M service provided within the previous 7 days nor will  to an E/M service or procedure within the next 24 hours or my soonest available appointment. Prevailing standard of care was able to be met in this audio-only visit.          I called the patient at [phone]  which is his new cell phone number. He is having repairs done in his house and currently lives at [facility].    Our initial Radiation Oncology evaluation was on 02/24/2025 to discuss his regional risk prostate adenocarcinoma.  The plan was to repeat MRI 5 months after initiation of anti testosterone therapy to help determine if patient is good candidate for rectal gel placement.    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    Had to move out of house due to housework. This is causing [place] stress[place] for transportation.     07/11/2025 PSA  0.08    07/14/2025 MRI prostate  18 cc gland     PI-RADS 2    During the phone visit the patient reports significant nocturia.  Has not improved since initiation of ADT.  I told the patient that he might be good candidate for rectal gel placement based on MRI.  He would prefer going to [facility] due to closeness to [facility].  I will reach out to Radiation Oncology at [facility] to transfer the patient's care      Thank you for the opportunity to care for this patient. Please do not hesitate to contact me with any questions.     Morgan Núñez MD/PhD

## 2025-07-24 ENCOUNTER — OFFICE VISIT (OUTPATIENT)
Dept: RADIATION ONCOLOGY | Facility: CLINIC | Age: 70
End: 2025-07-24
Payer: MEDICARE

## 2025-07-24 ENCOUNTER — SOCIAL WORK (OUTPATIENT)
Dept: HEMATOLOGY/ONCOLOGY | Facility: CLINIC | Age: 70
End: 2025-07-24
Payer: MEDICARE

## 2025-07-24 ENCOUNTER — DOCUMENTATION ONLY (OUTPATIENT)
Dept: RADIATION ONCOLOGY | Facility: CLINIC | Age: 70
End: 2025-07-24

## 2025-07-24 VITALS
WEIGHT: 175 LBS | HEART RATE: 79 BPM | DIASTOLIC BLOOD PRESSURE: 88 MMHG | SYSTOLIC BLOOD PRESSURE: 153 MMHG | BODY MASS INDEX: 24.41 KG/M2 | TEMPERATURE: 98 F

## 2025-07-24 DIAGNOSIS — C61 PRIMARY ADENOCARCINOMA OF PROSTATE: Primary | ICD-10-CM

## 2025-07-24 NOTE — PROGRESS NOTES
"Gavin Balderrama  [id number]  1955 7/24/2025  [facility]ez [facility]  [address] [place] [address]    REASON FOR CONSULTATION: JANIE, [id number] prostate adenocarcinoma, GS 4+3 (B PNI) iPSA 35.1 (now 0.08 on ADT+abiraterone)    TREATMENT GOAL: primary    HISTORY OF PRESENT ILLNESS:   Gavin Balderrama is a 69 y.o. male with family history of prostate cancer (father, brother) seen for LUTS + elevated PSA as below with MRI prostate read as PI-RADS 2 with suggestion of prostatitis. 30cc gland.     He underwent transrectal ultrasound and biopsy with Dr. Laguerre, 01/27/2025:    - Magnolia 4 + 3 adenocarcinoma: 40% RB (2/2, PNI)   - Magnolia 3 + 4 adenocarcinoma: 60% LA (2/2), 40% LM (2/2, PNI), 20% RA (1/2), 40% RM (2/2, PNI)   - 9/12 cores(+), B PNI   - ANGIE (-)    PSMA PET confirmed 3 avid foci in the bilateral gland, SUV up to 7.5 with bilateral subcentimeter iliac chain lymph nodes with mild uptake suspicious for regional metastases.  No distant metastases noted.    Patient consulted with Dr. Núñez and Dr. Sheridan and was placed on ADT plus abiraterone with subsequent decline in PSA, now 0.08 (from 35.1).    07/14/2025 MRI prostate again read as PI-RADS 2 without suspicious lesion.    PSA  7/25 0.08  5/25 0.15  4/25 0.74  4/25 2.76  3/25 8.96  3/25 29.7  3/25 31.6  2/25 35.1  11/24 29.3  8/24 24.4  7/24 17.6    He is temporarily relocated to [facility] x another 1-2months, awaiting completion of [place] [facility]  He presents to discuss radiation options.    AUA 23  QOL 6 "terrible"  IIEF 12    Reports nocturia times 5-6.  Denies dysuria or hematuria.  Increased frequency and urgency of urination, taking Flomax one tablet nightly.   Reports fatigue, hot flashes, diffuse achiness resulting in decreased activity level.  Denies diarrhea or bright red blood per rectum.  He is a smoker.    Review of systems otherwise negative unless indicated in HPI.    Past Medical History:   Diagnosis Date    " BPH (benign prostatic hyperplasia)     Disorder of thyroid, unspecified      No past surgical history on file.  Social History     Socioeconomic History    Marital status: Single   Tobacco Use    Smoking status: Every Day     Types: Cigarettes    Smokeless tobacco: Current    Tobacco comments:     3/7/25: still some tobacco use. -kp   Substance and Sexual Activity    Alcohol use: Not Currently    Drug use: Never    Sexual activity: Not Currently   Social History Narrative    Accompanied by wife, Juany and Tamela. One adult son. Previously worked in construction/ash.     Social Drivers of Health     Financial Resource Strain: Medium Risk (3/5/2025)    Overall Financial Resource Strain (CARDIA)     Difficulty of Paying Living Expenses: Somewhat hard   Food Insecurity: Food Insecurity Present (3/5/2025)    Hunger Vital Sign     Worried About Running Out of Food in the Last Year: Sometimes true     Ran Out of Food in the Last Year: Often true   Transportation Needs: Unmet Transportation Needs (3/5/2025)    PRAPARE - Transportation     Lack of Transportation (Medical): Yes     Lack of Transportation (Non-Medical): No   Physical Activity: Inactive (3/5/2025)    Exercise Vital Sign     Days of Exercise per Week: 0 days     Minutes of Exercise per Session: 0 min   Stress: No Stress Concern Present (3/5/2025)    [employer] - Occupational Stress Questionnaire     Feeling of Stress : Only a little   Housing Stability: Unknown (3/5/2025)    Housing Stability Vital Sign     Unable to Pay for Housing in the Last Year: Patient declined     Number of Times Moved in the Last Year: 0     Homeless in the Last Year: No     Family History   Problem Relation Name Age of Onset    Prostate cancer Father      Ovarian cancer Neg Hx      Pancreatic cancer Neg Hx         PRIOR HISTORY OF CHEMOTHERAPY OR RADIOTHERAPY: Please see HPI for patients prior oncologic history.    Medication List with Changes/Refills    Current Medications    ABIRATERONE (ZYTIGA) 250 MG TAB    Take 1 tablet (250 mg total) by mouth once daily Take with low fat meal.    CALCIUM CITRATE-VITAMIN D3 500 MG-12.5 MCG (500 UNIT) CHEW    Take 1 tablet by mouth 2 (two) times a day.    LEVOTHYROXINE (SYNTHROID, LEVOTHROID) 175 MCG TABLET    Take 1 tablet (175 mcg total) by mouth every morning.    PREDNISONE (DELTASONE) 5 MG TABLET    Take 1 tablet (5 mg total) by mouth once daily.    TAMSULOSIN (FLOMAX) 0.4 MG CAP    Take 2 capsules (0.8 mg total) by mouth once daily.     Review of patient's allergies indicates:   Allergen Reactions    Grass pollen-eliseo grass standard        QUALITY OF LIFE: 90%- Able to Carry on Normal Activity: Minor Symptoms of Disease    Vitals:    07/24/25 1000   BP: (!) 153/88   Pulse: 79   Temp: 98.1 °F (36.7 °C)   Weight: 79.4 kg (175 lb)   PainSc:   4   PainLoc: Mouth     Body mass index is 24.41 kg/m².    PHYSICAL EXAM:   GENERAL: alert; in no apparent distress.   HEAD: normocephalic, atraumatic.  EYES: pupils are equal, round, reactive to light and accommodation. Sclera anicteric. Conjunctiva not injected.   NOSE/THROAT: no nasal erythema or rhinorrhea. Oropharynx pink, without erythema, ulcerations or thrush.   Poor dentition  NECK: no cervical motion rigidity; supple with no masses.    CHEST: Patient is speaking comfortably on room air with normal work of breathing without using accessory muscles of respiration.  CARDIOVASCULAR: regular rate and rhythm  ABDOMEN: soft, nontender, nondistended.   MUSCULOSKELETAL: no tenderness to palpation along the spine or scapulae. Normal range of motion.  NEUROLOGIC: cranial nerves II-XII intact bilaterally. Strength 5/5 in bilateral upper and lower extremities. No sensory deficits appreciated. Normal gait.  EXTREMITIES: + clubbing; no cyanosis, edema.  SKIN: no erythema, rashes, ulcerations noted.     REVIEW OF IMAGING/PATHOLOGY/LABS: Please see HPI. All images reviewed personally by  dictating physician.     ASSESSMENT: Gavin Balderrama is a 69 y.o. male with stage JANIE, [id number] prostate adenocarcinoma, GS 4+3 (B PNI) iPSA 35.1 (now 0.08 on ADT+abiraterone)  PLAN:  Gavin Balderrama  presented with elevated PSA of 35.1, transrectal ultrasound and biopsy revealing high volume Magdalena 7 disease with bilateral perineural invasion with PSMA PET evidence of  regional lymphatics spread.  MRI prostate was bland.  He was placed on ADT and abiraterone and PSA has responded appropriately, now down to 0.08.  He previously discussed definitive radiotherapy with Dr. Núñez at [facility]  near his home but is temporarily relocated to the [facility] and presents to discuss options here.  He plans to be back in his home in the next couple of months.    Today we discussed definitive intent external beam radiotherapy for N+ disease using modern IMRT techniques to spare the surrounding bowel, bladder, rectum, bony pelvis.  I described IGRT requiring gold fiducial +/- barrigel placement as well as the benefits of daily bowel and bladder preparation to mitigate toxicity to those organs.  We discussed fractionation schedules including standard fractionation and moderate hypofractionation with potentially increased risk of GI toxicity with the latter.   I explained that either fractionation schedule would allow for differential dosing of his prostate/SV, involved lymph nodes and elective lymphatics with my preference being towards standard fractionation as several lymph nodes will require boosting and this will be safest for the surrounding bowel.   My recommendation is 8100 cGy in 45 fractions.    Patient plans to return to his home near [facility] and given timeline for fiducials, barrigel, SIM and start while presently on ADT + abiraterone with declining PSA, I advised he can wait to begin radiotherapy to the fall once back in his home which is his preference.   Will notify Dr. Núñez.     The patient has our contact information  and understands that they are free to contact us at any time with questions or concerns regarding radiation therapy.     DISPOSITION: RTC PRN     I have personally seen and evaluated this patient with a high complexity diagnosis.      60 minutes were dedicated to reviewing/interpreting pertinent laboratory/imaging/pathology as well as prior consultations; reviewing and performing history and physical; counseling patient on oncologic recommendations; documentation in the electronic medical record including ordering of additional tests and/or radiation treatment protocol; and coordination of care with physicians with referrals placed as appropriate.    PHYSICIAN: Otto Rajan Jr, MD    Thank you for the opportunity to meet and consult with Gavin Balderrama.   Please feel free to contact me to discuss the above recommendation further.

## 2025-07-24 NOTE — Clinical Note
Morgan-- Patient plans to [place] near [facility] and given timeline for fiducials, ronald, EDIE and start while presently on ADT + abiraterone with declining PSA, I advised he can wait to begin radiotherapy to the fall once back in his home which is his preference. Please arrange. Thanks!

## 2025-08-05 ENCOUNTER — DOCUMENTATION ONLY (OUTPATIENT)
Dept: HEMATOLOGY/ONCOLOGY | Facility: CLINIC | Age: 70
End: 2025-08-05
Payer: MEDICARE
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
## 2025-08-14 ENCOUNTER — LAB VISIT (OUTPATIENT)
Dept: LAB | Facility: HOSPITAL | Age: 70
End: 2025-08-14
Attending: INTERNAL MEDICINE
Payer: MEDICARE

## 2025-08-14 DIAGNOSIS — I70.90 ATHEROSCLEROSIS: ICD-10-CM

## 2025-08-14 DIAGNOSIS — E03.9 HYPOTHYROIDISM, UNSPECIFIED TYPE: ICD-10-CM

## 2025-08-14 DIAGNOSIS — C61 PRIMARY ADENOCARCINOMA OF PROSTATE: ICD-10-CM

## 2025-08-14 LAB
ALBUMIN SERPL BCP-MCNC: 4.1 G/DL (ref 3.5–5.2)
ALP SERPL-CCNC: 83 UNIT/L (ref 40–150)
ALT SERPL W/O P-5'-P-CCNC: 12 UNIT/L (ref 0–55)
ANION GAP (OHS): 8 MMOL/L (ref 8–16)
AST SERPL-CCNC: 23 UNIT/L (ref 0–50)
BILIRUB SERPL-MCNC: 0.4 MG/DL (ref 0.1–1)
BUN SERPL-MCNC: 16 MG/DL (ref 8–23)
CALCIUM SERPL-MCNC: 10.3 MG/DL (ref 8.7–10.5)
CHLORIDE SERPL-SCNC: 102 MMOL/L (ref 95–110)
CHOLEST SERPL-MCNC: 169 MG/DL (ref 120–199)
CHOLEST/HDLC SERPL: 3.4 {RATIO} (ref 2–5)
CO2 SERPL-SCNC: 30 MMOL/L (ref 23–29)
CREAT SERPL-MCNC: 1.1 MG/DL (ref 0.5–1.4)
GFR SERPLBLD CREATININE-BSD FMLA CKD-EPI: >60 ML/MIN/1.73/M2
GLUCOSE SERPL-MCNC: 92 MG/DL (ref 70–110)
HDLC SERPL-MCNC: 50 MG/DL (ref 40–75)
HDLC SERPL: 29.6 % (ref 20–50)
LDLC SERPL CALC-MCNC: 96.6 MG/DL (ref 63–159)
NONHDLC SERPL-MCNC: 119 MG/DL
POTASSIUM SERPL-SCNC: 4.2 MMOL/L (ref 3.5–5.1)
PROT SERPL-MCNC: 7.6 GM/DL (ref 6–8.4)
SODIUM SERPL-SCNC: 140 MMOL/L (ref 136–145)
T4 FREE SERPL-MCNC: 1.2 NG/DL (ref 0.71–1.51)
TRIGL SERPL-MCNC: 112 MG/DL (ref 30–150)
TSH SERPL-ACNC: 0.28 UIU/ML (ref 0.4–4)

## 2025-08-14 PROCEDURE — 84443 ASSAY THYROID STIM HORMONE: CPT

## 2025-08-14 PROCEDURE — 36415 COLL VENOUS BLD VENIPUNCTURE: CPT

## 2025-08-14 PROCEDURE — 84439 ASSAY OF FREE THYROXINE: CPT

## 2025-08-14 PROCEDURE — 82040 ASSAY OF SERUM ALBUMIN: CPT

## 2025-08-14 PROCEDURE — 80061 LIPID PANEL: CPT

## 2025-08-20 ENCOUNTER — TELEPHONE (OUTPATIENT)
Dept: PULMONOLOGY | Facility: CLINIC | Age: 70
End: 2025-08-20
Payer: MEDICARE

## 2025-08-20 DIAGNOSIS — Z87.891 HISTORY OF TOBACCO USE: Primary | ICD-10-CM

## 2025-08-21 ENCOUNTER — OFFICE VISIT (OUTPATIENT)
Dept: INTERNAL MEDICINE | Facility: CLINIC | Age: 70
End: 2025-08-21
Payer: MEDICARE

## 2025-08-21 VITALS
OXYGEN SATURATION: 97 % | SYSTOLIC BLOOD PRESSURE: 120 MMHG | WEIGHT: 175.5 LBS | HEART RATE: 78 BPM | BODY MASS INDEX: 24.57 KG/M2 | HEIGHT: 71 IN | DIASTOLIC BLOOD PRESSURE: 84 MMHG

## 2025-08-21 DIAGNOSIS — C61 PRIMARY ADENOCARCINOMA OF PROSTATE: ICD-10-CM

## 2025-08-21 DIAGNOSIS — E03.9 HYPOTHYROIDISM, UNSPECIFIED TYPE: ICD-10-CM

## 2025-08-21 DIAGNOSIS — N18.31 STAGE 3A CHRONIC KIDNEY DISEASE: ICD-10-CM

## 2025-08-21 DIAGNOSIS — M17.9 OSTEOARTHRITIS OF KNEE, UNSPECIFIED LATERALITY, UNSPECIFIED OSTEOARTHRITIS TYPE: ICD-10-CM

## 2025-08-21 DIAGNOSIS — F14.10 COCAINE ABUSE: ICD-10-CM

## 2025-08-21 DIAGNOSIS — Z72.0 TOBACCO ABUSE: ICD-10-CM

## 2025-08-21 DIAGNOSIS — M17.12 PRIMARY LOCALIZED OSTEOARTHROSIS OF LEFT LOWER LEG: Primary | ICD-10-CM

## 2025-08-21 PROCEDURE — 1101F PT FALLS ASSESS-DOCD LE1/YR: CPT | Mod: CPTII,S$GLB,, | Performed by: INTERNAL MEDICINE

## 2025-08-21 PROCEDURE — 3074F SYST BP LT 130 MM HG: CPT | Mod: CPTII,S$GLB,, | Performed by: INTERNAL MEDICINE

## 2025-08-21 PROCEDURE — 3288F FALL RISK ASSESSMENT DOCD: CPT | Mod: CPTII,S$GLB,, | Performed by: INTERNAL MEDICINE

## 2025-08-21 PROCEDURE — 3079F DIAST BP 80-89 MM HG: CPT | Mod: CPTII,S$GLB,, | Performed by: INTERNAL MEDICINE

## 2025-08-21 PROCEDURE — G2211 COMPLEX E/M VISIT ADD ON: HCPCS | Mod: S$GLB,,, | Performed by: INTERNAL MEDICINE

## 2025-08-21 PROCEDURE — 3008F BODY MASS INDEX DOCD: CPT | Mod: CPTII,S$GLB,, | Performed by: INTERNAL MEDICINE

## 2025-08-21 PROCEDURE — 1125F AMNT PAIN NOTED PAIN PRSNT: CPT | Mod: CPTII,S$GLB,, | Performed by: INTERNAL MEDICINE

## 2025-08-21 PROCEDURE — 99999 PR PBB SHADOW E&M-EST. PATIENT-LVL III: CPT | Mod: PBBFAC,,, | Performed by: INTERNAL MEDICINE

## 2025-08-21 PROCEDURE — 99214 OFFICE O/P EST MOD 30 MIN: CPT | Mod: S$GLB,,, | Performed by: INTERNAL MEDICINE

## 2025-08-21 PROCEDURE — 1159F MED LIST DOCD IN RCRD: CPT | Mod: CPTII,S$GLB,, | Performed by: INTERNAL MEDICINE

## 2025-08-21 RX ORDER — DICLOFENAC SODIUM 50 MG/1
50 TABLET, DELAYED RELEASE ORAL 2 TIMES DAILY
Qty: 60 TABLET | Refills: 2 | Status: SHIPPED | OUTPATIENT
Start: 2025-08-21

## 2025-08-28 ENCOUNTER — INFUSION (OUTPATIENT)
Dept: INFUSION THERAPY | Facility: HOSPITAL | Age: 70
End: 2025-08-28
Payer: MEDICARE

## 2025-08-28 ENCOUNTER — HOSPITAL ENCOUNTER (OUTPATIENT)
Dept: RADIOLOGY | Facility: HOSPITAL | Age: 70
Discharge: HOME | End: 2025-08-28
Attending: INTERNAL MEDICINE
Payer: MEDICARE

## 2025-08-28 ENCOUNTER — HOSPITAL ENCOUNTER (OUTPATIENT)
Dept: RADIOLOGY | Facility: HOSPITAL | Age: 70
Discharge: HOME | End: 2025-08-28
Attending: NURSE PRACTITIONER
Payer: MEDICARE

## 2025-08-28 ENCOUNTER — OFFICE VISIT (OUTPATIENT)
Dept: HEMATOLOGY/ONCOLOGY | Facility: CLINIC | Age: 70
End: 2025-08-28
Payer: MEDICARE

## 2025-08-28 ENCOUNTER — OFFICE VISIT (OUTPATIENT)
Dept: ORTHOPEDICS | Facility: CLINIC | Age: 70
End: 2025-08-28
Payer: MEDICARE

## 2025-08-28 VITALS
BODY MASS INDEX: 24.88 KG/M2 | SYSTOLIC BLOOD PRESSURE: 188 MMHG | HEIGHT: 71 IN | HEART RATE: 66 BPM | RESPIRATION RATE: 18 BRPM | DIASTOLIC BLOOD PRESSURE: 91 MMHG | TEMPERATURE: 98 F | OXYGEN SATURATION: 98 % | WEIGHT: 177.69 LBS

## 2025-08-28 DIAGNOSIS — M25.562 ACUTE PAIN OF BOTH KNEES: Primary | ICD-10-CM

## 2025-08-28 DIAGNOSIS — R23.2 HOT FLASH DUE TO MEDICATION: ICD-10-CM

## 2025-08-28 DIAGNOSIS — C61 PRIMARY ADENOCARCINOMA OF PROSTATE: Primary | ICD-10-CM

## 2025-08-28 DIAGNOSIS — Z87.891 HISTORY OF TOBACCO USE: ICD-10-CM

## 2025-08-28 DIAGNOSIS — M25.562 ACUTE PAIN OF BOTH KNEES: ICD-10-CM

## 2025-08-28 DIAGNOSIS — M25.561 ACUTE PAIN OF BOTH KNEES: Primary | ICD-10-CM

## 2025-08-28 DIAGNOSIS — M25.561 ACUTE PAIN OF BOTH KNEES: ICD-10-CM

## 2025-08-28 DIAGNOSIS — M17.0 PRIMARY OSTEOARTHRITIS OF BOTH KNEES: Primary | ICD-10-CM

## 2025-08-28 DIAGNOSIS — N18.31 STAGE 3A CHRONIC KIDNEY DISEASE: ICD-10-CM

## 2025-08-28 DIAGNOSIS — T50.905A HOT FLASH DUE TO MEDICATION: ICD-10-CM

## 2025-08-28 DIAGNOSIS — C61 PRIMARY ADENOCARCINOMA OF PROSTATE: ICD-10-CM

## 2025-08-28 DIAGNOSIS — Z72.0 TOBACCO ABUSE: ICD-10-CM

## 2025-08-28 PROCEDURE — 1159F MED LIST DOCD IN RCRD: CPT | Mod: CPTII,S$GLB,, | Performed by: HOSPITALIST

## 2025-08-28 PROCEDURE — 3080F DIAST BP >= 90 MM HG: CPT | Mod: CPTII,S$GLB,, | Performed by: HOSPITALIST

## 2025-08-28 PROCEDURE — 1101F PT FALLS ASSESS-DOCD LE1/YR: CPT | Mod: CPTII,S$GLB,, | Performed by: NURSE PRACTITIONER

## 2025-08-28 PROCEDURE — 99214 OFFICE O/P EST MOD 30 MIN: CPT | Mod: S$GLB,,, | Performed by: HOSPITALIST

## 2025-08-28 PROCEDURE — 3288F FALL RISK ASSESSMENT DOCD: CPT | Mod: CPTII,S$GLB,, | Performed by: NURSE PRACTITIONER

## 2025-08-28 PROCEDURE — 99999 PR PBB SHADOW E&M-EST. PATIENT-LVL III: CPT | Mod: PBBFAC,,, | Performed by: NURSE PRACTITIONER

## 2025-08-28 PROCEDURE — 73564 X-RAY EXAM KNEE 4 OR MORE: CPT | Mod: 26,50,, | Performed by: RADIOLOGY

## 2025-08-28 PROCEDURE — 1101F PT FALLS ASSESS-DOCD LE1/YR: CPT | Mod: CPTII,S$GLB,, | Performed by: HOSPITALIST

## 2025-08-28 PROCEDURE — 3288F FALL RISK ASSESSMENT DOCD: CPT | Mod: CPTII,S$GLB,, | Performed by: HOSPITALIST

## 2025-08-28 PROCEDURE — 1125F AMNT PAIN NOTED PAIN PRSNT: CPT | Mod: CPTII,S$GLB,, | Performed by: NURSE PRACTITIONER

## 2025-08-28 PROCEDURE — 73564 X-RAY EXAM KNEE 4 OR MORE: CPT | Mod: TC,50

## 2025-08-28 PROCEDURE — 3077F SYST BP >= 140 MM HG: CPT | Mod: CPTII,S$GLB,, | Performed by: HOSPITALIST

## 2025-08-28 PROCEDURE — 1126F AMNT PAIN NOTED NONE PRSNT: CPT | Mod: CPTII,S$GLB,, | Performed by: HOSPITALIST

## 2025-08-28 PROCEDURE — 63600175 PHARM REV CODE 636 W HCPCS: Mod: JZ,TB | Performed by: HOSPITALIST

## 2025-08-28 PROCEDURE — 96402 CHEMO HORMON ANTINEOPL SQ/IM: CPT

## 2025-08-28 PROCEDURE — 1160F RVW MEDS BY RX/DR IN RCRD: CPT | Mod: CPTII,S$GLB,, | Performed by: NURSE PRACTITIONER

## 2025-08-28 PROCEDURE — 71250 CT THORAX DX C-: CPT | Mod: TC

## 2025-08-28 PROCEDURE — 3008F BODY MASS INDEX DOCD: CPT | Mod: CPTII,S$GLB,, | Performed by: HOSPITALIST

## 2025-08-28 PROCEDURE — 99214 OFFICE O/P EST MOD 30 MIN: CPT | Mod: S$GLB,,, | Performed by: NURSE PRACTITIONER

## 2025-08-28 PROCEDURE — 1159F MED LIST DOCD IN RCRD: CPT | Mod: CPTII,S$GLB,, | Performed by: NURSE PRACTITIONER

## 2025-08-28 PROCEDURE — 99999 PR PBB SHADOW E&M-EST. PATIENT-LVL III: CPT | Mod: PBBFAC,,, | Performed by: HOSPITALIST

## 2025-08-28 RX ADMIN — LEUPROLIDE ACETATE 22.5 MG: KIT at 02:08

## 2025-08-29 ENCOUNTER — OFFICE VISIT (OUTPATIENT)
Dept: RADIATION ONCOLOGY | Facility: CLINIC | Age: 70
End: 2025-08-29
Payer: MEDICARE

## 2025-08-29 DIAGNOSIS — C61 PROSTATE CANCER: Primary | ICD-10-CM
